# Patient Record
Sex: FEMALE | Race: WHITE | NOT HISPANIC OR LATINO | Employment: FULL TIME | URBAN - METROPOLITAN AREA
[De-identification: names, ages, dates, MRNs, and addresses within clinical notes are randomized per-mention and may not be internally consistent; named-entity substitution may affect disease eponyms.]

---

## 2023-08-04 ENCOUNTER — HOSPITAL ENCOUNTER (OUTPATIENT)
Dept: DATA CONVERSION | Facility: HOSPITAL | Age: 58
End: 2023-08-04
Attending: INTERNAL MEDICINE | Admitting: INTERNAL MEDICINE
Payer: COMMERCIAL

## 2023-08-04 DIAGNOSIS — Z12.11 ENCOUNTER FOR SCREENING FOR MALIGNANT NEOPLASM OF COLON: ICD-10-CM

## 2023-09-14 PROBLEM — J32.0 MAXILLARY SINUSITIS: Status: ACTIVE | Noted: 2019-02-21

## 2023-09-14 PROBLEM — L03.116 CELLULITIS OF LEFT LEG: Status: ACTIVE | Noted: 2021-08-27

## 2023-09-14 PROBLEM — S71.102A OPEN WOUND OF LEFT THIGH: Status: ACTIVE | Noted: 2021-08-30

## 2023-09-14 PROBLEM — R21 RASH AND OTHER NONSPECIFIC SKIN ERUPTION: Status: ACTIVE | Noted: 2023-08-07

## 2023-09-14 PROBLEM — D18.01 HEMANGIOMA OF SKIN AND SUBCUTANEOUS TISSUE: Status: ACTIVE | Noted: 2023-08-07

## 2023-09-14 PROBLEM — R00.2 PALPITATIONS: Status: ACTIVE | Noted: 2019-09-12

## 2023-09-14 PROBLEM — L73.8 OTHER SPECIFIED FOLLICULAR DISORDERS: Status: ACTIVE | Noted: 2023-08-07

## 2023-09-14 PROBLEM — E04.2 NONTOXIC MULTINODULAR GOITER: Status: ACTIVE | Noted: 2023-09-14

## 2023-09-14 PROBLEM — E66.3 OVERWEIGHT: Status: ACTIVE | Noted: 2019-09-12

## 2023-09-14 PROBLEM — E03.9 HYPOTHYROIDISM: Status: ACTIVE | Noted: 2018-06-21

## 2023-09-14 PROBLEM — J34.0 CELLULITIS OF NOSTRIL: Status: ACTIVE | Noted: 2021-03-02

## 2023-09-14 PROBLEM — L08.9 SKIN INFECTION: Status: ACTIVE | Noted: 2022-03-16

## 2023-09-14 PROBLEM — J04.0 LARYNGITIS: Status: ACTIVE | Noted: 2022-12-20

## 2023-09-14 PROBLEM — N95.1 MENOPAUSAL STATE: Status: ACTIVE | Noted: 2021-11-02

## 2023-09-14 PROBLEM — M13.841: Status: ACTIVE | Noted: 2018-06-21

## 2023-09-14 PROBLEM — L71.9 ROSACEA: Status: ACTIVE | Noted: 2022-11-08

## 2023-09-14 PROBLEM — E06.3 AUTOIMMUNE THYROIDITIS: Status: ACTIVE | Noted: 2023-09-14

## 2023-09-14 RX ORDER — IVERMECTIN 10 MG/G
1 CREAM TOPICAL DAILY
COMMUNITY
Start: 2023-06-13

## 2023-09-14 RX ORDER — DOXYCYCLINE 100 MG/1
100 CAPSULE ORAL 2 TIMES DAILY
COMMUNITY
Start: 2023-06-14 | End: 2023-11-16 | Stop reason: ALTCHOICE

## 2023-09-14 RX ORDER — ESTRADIOL 0.5 MG/1
0.5 TABLET ORAL DAILY
COMMUNITY

## 2023-09-14 RX ORDER — MULTIVIT,IRON,MINERALS/LUTEIN
TABLET ORAL
COMMUNITY

## 2023-09-14 RX ORDER — ESTRADIOL 1 MG/1
TABLET ORAL
COMMUNITY
Start: 2017-07-25 | End: 2023-11-16 | Stop reason: ALTCHOICE

## 2023-09-14 RX ORDER — LEVOTHYROXINE SODIUM 50 UG/1
50 TABLET ORAL
COMMUNITY
End: 2024-01-10

## 2023-10-25 ENCOUNTER — OFFICE VISIT (OUTPATIENT)
Dept: DERMATOLOGY | Facility: CLINIC | Age: 58
End: 2023-10-25
Payer: COMMERCIAL

## 2023-10-25 DIAGNOSIS — D48.5 NEOPLASM OF UNCERTAIN BEHAVIOR OF SKIN: ICD-10-CM

## 2023-10-25 DIAGNOSIS — L71.9 ROSACEA: Primary | ICD-10-CM

## 2023-10-25 PROCEDURE — 88305 TISSUE EXAM BY PATHOLOGIST: CPT | Performed by: DERMATOLOGY

## 2023-10-25 PROCEDURE — 11102 TANGNTL BX SKIN SINGLE LES: CPT | Performed by: STUDENT IN AN ORGANIZED HEALTH CARE EDUCATION/TRAINING PROGRAM

## 2023-10-25 PROCEDURE — 88305 TISSUE EXAM BY PATHOLOGIST: CPT | Mod: TC,DER | Performed by: DERMATOLOGY

## 2023-10-25 PROCEDURE — 88312 SPECIAL STAINS GROUP 1: CPT | Performed by: DERMATOLOGY

## 2023-10-25 PROCEDURE — 11103 TANGNTL BX SKIN EA SEP/ADDL: CPT | Performed by: STUDENT IN AN ORGANIZED HEALTH CARE EDUCATION/TRAINING PROGRAM

## 2023-10-25 PROCEDURE — 1036F TOBACCO NON-USER: CPT | Performed by: DERMATOLOGY

## 2023-10-25 PROCEDURE — 99213 OFFICE O/P EST LOW 20 MIN: CPT | Performed by: DERMATOLOGY

## 2023-10-25 NOTE — PROGRESS NOTES
Subjective     Reymundo Sweet is a 57 y.o. female who presents for the following: Rosacea (Using Soolantra cream. Improved since last visit. ).     Pt presents for 5 month follow up for rosacea. At the last visit she had just experienced a flare that also involved neck and chest. She remained clear on doxycycline 100mg BID and Soolantra cream once daily. She had to undergo a colonscopy in early August and could not take doxy at that time since she was not eating food. She decided to remain off of doxycycline and has not flared.     The patient also points out a red bump on her left anterior leg, present for months, scales intermittently. Denies pain or bleeding. She has no personal history of skin cancer but reports past sun exposure without sunscreen.    Review of Systems:  No other skin or systemic complaints other than what is documented elsewhere in the note.    Skin Cancer History  No skin cancer on file.      Specialty Problems          Dermatology Problems    Open wound of left thigh    Rosacea    Hemangioma of skin and subcutaneous tissue    Other specified follicular disorders    Rash and other nonspecific skin eruption        Objective   Well appearing patient in no apparent distress; mood and affect are within normal limits.    A focused skin examination was performed. All findings within normal limits unless otherwise noted below.    Assessment/Plan   1. Rosacea  Head - Anterior (Face)  Mid face erythema with telangiectasias and scattered inflammatory papules.    Currently erythematotelangiectatic subtype, well controlled on Soolantra. She has had flares of papulopustular variant in past that responded well to 3 months of doxycycline 100mg BID  PLAN  Continue Soolantra cream once daily  Plan to repeat doxycycline 100mg BID as needed for flares  Reviewed the importance of sun protection    Related Procedures  Follow Up In Dermatology    2. Neoplasm of uncertain behavior of skin (2)  Left anterior leg -  superior  Erythematous scaly papule          Shave removal    Informed consent: discussed and consent obtained    Timeout: patient name, date of birth, surgical site, and procedure verified    Procedure prep:  Patient was prepped and draped  Anesthesia: the lesion was anesthetized in a standard fashion    Anesthetic:  1% lidocaine w/ epinephrine 1-100,000 local infiltration  Instrument used: DermaBlade    Hemostasis achieved with: aluminum chloride    Outcome: patient tolerated procedure well    Post-procedure details: sterile dressing applied and wound care instructions given    Dressing type: bandage and petrolatum      Staff Communication: Dermatology Local Anesthesia: 1 % Lidocaine / Epinephrine - Amount: 3cc    Specimen 1 - Dermatopathology- DERM LAB  Differential Diagnosis: BCC vs SCC  Check Margins Yes/No?:  No  Comments:    Dermpath Lab: Routine Histopathology (formalin-fixed tissue)    Left anterior leg - inferior  Erythematous scaly papule              Staff Communication: Dermatology Local Anesthesia: 1 % Lidocaine / Epinephrine - Amount: 3cc    Shave removal    Informed consent: discussed and consent obtained    Timeout: patient name, date of birth, surgical site, and procedure verified    Procedure prep:  Patient was prepped and draped  Anesthesia: the lesion was anesthetized in a standard fashion    Anesthetic:  1% lidocaine w/ epinephrine 1-100,000 local infiltration  Instrument used: DermaBlade    Hemostasis achieved with: aluminum chloride    Outcome: patient tolerated procedure well    Post-procedure details: sterile dressing applied and wound care instructions given    Dressing type: bandage and petrolatum      Specimen 2 - Dermatopathology- DERM LAB  Differential Diagnosis: BCC vs SCC  Check Margins Yes/No?:    Comments:    Dermpath Lab: Routine Histopathology (formalin-fixed tissue)      Follow up in 6 months for FBSE

## 2023-10-30 ENCOUNTER — OFFICE VISIT (OUTPATIENT)
Dept: ORTHOPEDIC SURGERY | Facility: CLINIC | Age: 58
End: 2023-10-30
Payer: COMMERCIAL

## 2023-10-30 DIAGNOSIS — M19.049 CMC ARTHRITIS: Primary | ICD-10-CM

## 2023-10-30 LAB
LABORATORY COMMENT REPORT: NORMAL
PATH REPORT.FINAL DX SPEC: NORMAL
PATH REPORT.GROSS SPEC: NORMAL
PATH REPORT.MICROSCOPIC SPEC OTHER STN: NORMAL
PATH REPORT.RELEVANT HX SPEC: NORMAL
PATH REPORT.TOTAL CANCER: NORMAL

## 2023-10-30 PROCEDURE — 1036F TOBACCO NON-USER: CPT | Performed by: ORTHOPAEDIC SURGERY

## 2023-10-30 PROCEDURE — 99213 OFFICE O/P EST LOW 20 MIN: CPT | Performed by: ORTHOPAEDIC SURGERY

## 2023-10-30 PROCEDURE — 20600 DRAIN/INJ JOINT/BURSA W/O US: CPT | Performed by: ORTHOPAEDIC SURGERY

## 2023-10-30 RX ORDER — TRIAMCINOLONE ACETONIDE 40 MG/ML
20 INJECTION, SUSPENSION INTRA-ARTICULAR; INTRAMUSCULAR
Status: COMPLETED | OUTPATIENT
Start: 2023-10-30 | End: 2023-10-30

## 2023-10-30 RX ORDER — LIDOCAINE HYDROCHLORIDE 10 MG/ML
0.5 INJECTION INFILTRATION; PERINEURAL
Status: COMPLETED | OUTPATIENT
Start: 2023-10-30 | End: 2023-10-30

## 2023-10-30 RX ADMIN — TRIAMCINOLONE ACETONIDE 20 MG: 40 INJECTION, SUSPENSION INTRA-ARTICULAR; INTRAMUSCULAR at 08:59

## 2023-10-30 RX ADMIN — LIDOCAINE HYDROCHLORIDE 0.5 ML: 10 INJECTION INFILTRATION; PERINEURAL at 08:59

## 2023-10-30 ASSESSMENT — PAIN - FUNCTIONAL ASSESSMENT: PAIN_FUNCTIONAL_ASSESSMENT: NO/DENIES PAIN

## 2023-10-30 NOTE — PROGRESS NOTES
Patient returns to follow-up on her bilateral thumb CMC joint arthritis which we have managed with injections.  Most recent injection 4 months ago.  She is in the process of moving and has aggravated her thumbs over the last couple of weeks.  The cold wet weather also seems to be a contributing factor to her symptom exacerbation.  She presents today for repeat injections.    Past medical history, medications, allergies, surgical history and review of systems have been reviewed with the patient. Pertinent changes are documented in the HPI. Otherwise they are unchanged when compared to last visit on July 3, 2023.    Physical Examination Findings:  Constitutional: Appears well-developed and well-nourished.  Head: Normocephalic and atraumatic.  Eyes: Pupils are equal and round.  Cardiovascular: Intact distal pulses.   Respiratory: Effort normal. No respiratory distress.  Neurologic: Alert and oriented to person, place, and time.  Skin: Skin is warm and dry.  Hematologic / Lymphatic: No lymphedema, lymphangitis.  Psychiatric: normal mood and affect. Behavior is normal.   Musculoskeletal: Bilateral hand examination reveals bony prominence around the thumb CMC joints.  Positive CMC grind test.  No MP joint instability.  No thenar atrophy.  Normal sensation.    Impression: Bilateral thumb CMC arthritis.    Plan: Steroid injections administered at patient request.    S Inj/Asp: bilateral thumb CMC on 10/30/2023 8:59 AM  Indications: pain  Details: 25 G needle, dorsal approach  Medications (Right): 20 mg triamcinolone acetonide 40 mg/mL; 0.5 mL lidocaine 10 mg/mL (1 %)  Medications (Left): 20 mg triamcinolone acetonide 40 mg/mL; 0.5 mL lidocaine 10 mg/mL (1 %)  Outcome: tolerated well, no immediate complications  Procedure, treatment alternatives, risks and benefits explained, specific risks discussed. Consent was given by the patient. Immediately prior to procedure a time out was called to verify the correct patient,  procedure, equipment, support staff and site/side marked as required. Patient was prepped and draped in the usual sterile fashion.         Return as needed for recurrence or progression of problems .    Alvaro Junior MD    [facility] [employer]  Department of Orthopaedic Surgery  Chief of Hand and Upper Extremity Surgery  [facility]    Dictation performed with the use of voice recognition software. Syntax and grammatical errors may exist.

## 2023-11-08 ENCOUNTER — LAB (OUTPATIENT)
Dept: LAB | Facility: LAB | Age: 58
End: 2023-11-08
Payer: COMMERCIAL

## 2023-11-08 DIAGNOSIS — Z00.00 ENCOUNTER FOR GENERAL ADULT MEDICAL EXAMINATION WITHOUT ABNORMAL FINDINGS: Primary | ICD-10-CM

## 2023-11-08 DIAGNOSIS — Z79.899 OTHER LONG TERM (CURRENT) DRUG THERAPY: ICD-10-CM

## 2023-11-08 DIAGNOSIS — E03.9 HYPOTHYROIDISM, UNSPECIFIED: ICD-10-CM

## 2023-11-08 LAB
ALBUMIN SERPL-MCNC: 4.4 G/DL (ref 3.5–5)
ALP BLD-CCNC: 45 U/L (ref 35–125)
ALT SERPL-CCNC: 13 U/L (ref 5–40)
ANION GAP SERPL CALC-SCNC: 11 MMOL/L
APPEARANCE UR: CLEAR
AST SERPL-CCNC: 15 U/L (ref 5–40)
BASOPHILS # BLD AUTO: 0 X10*3/UL (ref 0–0.1)
BASOPHILS NFR BLD AUTO: 0 %
BILIRUB SERPL-MCNC: 0.5 MG/DL (ref 0.1–1.2)
BILIRUB UR STRIP.AUTO-MCNC: NEGATIVE MG/DL
BUN SERPL-MCNC: 14 MG/DL (ref 8–25)
CALCIUM SERPL-MCNC: 9.2 MG/DL (ref 8.5–10.4)
CHLORIDE SERPL-SCNC: 102 MMOL/L (ref 97–107)
CHOLEST SERPL-MCNC: 225 MG/DL (ref 133–200)
CHOLEST/HDLC SERPL: 2.3 {RATIO}
CO2 SERPL-SCNC: 26 MMOL/L (ref 24–31)
COLOR UR: COLORLESS
CREAT SERPL-MCNC: 0.7 MG/DL (ref 0.4–1.6)
EOSINOPHIL # BLD AUTO: 0.06 X10*3/UL (ref 0–0.7)
EOSINOPHIL NFR BLD AUTO: 1.4 %
ERYTHROCYTE [DISTWIDTH] IN BLOOD BY AUTOMATED COUNT: 11.8 % (ref 11.5–14.5)
GFR SERPL CREATININE-BSD FRML MDRD: >90 ML/MIN/1.73M*2
GLUCOSE SERPL-MCNC: 87 MG/DL (ref 65–99)
GLUCOSE UR STRIP.AUTO-MCNC: NORMAL MG/DL
HCT VFR BLD AUTO: 42 % (ref 36–46)
HDLC SERPL-MCNC: 100 MG/DL
HGB BLD-MCNC: 13.5 G/DL (ref 12–16)
IMM GRANULOCYTES # BLD AUTO: 0.01 X10*3/UL (ref 0–0.7)
IMM GRANULOCYTES NFR BLD AUTO: 0.2 % (ref 0–0.9)
KETONES UR STRIP.AUTO-MCNC: NEGATIVE MG/DL
LDLC SERPL CALC-MCNC: 112 MG/DL (ref 65–130)
LEUKOCYTE ESTERASE UR QL STRIP.AUTO: NEGATIVE
LYMPHOCYTES # BLD AUTO: 1.51 X10*3/UL (ref 1.2–4.8)
LYMPHOCYTES NFR BLD AUTO: 35.4 %
MCH RBC QN AUTO: 30.4 PG (ref 26–34)
MCHC RBC AUTO-ENTMCNC: 32.1 G/DL (ref 32–36)
MCV RBC AUTO: 95 FL (ref 80–100)
MONOCYTES # BLD AUTO: 0.4 X10*3/UL (ref 0.1–1)
MONOCYTES NFR BLD AUTO: 9.4 %
NEUTROPHILS # BLD AUTO: 2.29 X10*3/UL (ref 1.2–7.7)
NEUTROPHILS NFR BLD AUTO: 53.6 %
NITRITE UR QL STRIP.AUTO: NEGATIVE
NRBC BLD-RTO: 0 /100 WBCS (ref 0–0)
PH UR STRIP.AUTO: 6.5 [PH]
PLATELET # BLD AUTO: 275 X10*3/UL (ref 150–450)
POTASSIUM SERPL-SCNC: 4.1 MMOL/L (ref 3.4–5.1)
PROT SERPL-MCNC: 6.6 G/DL (ref 5.9–7.9)
PROT UR STRIP.AUTO-MCNC: NEGATIVE MG/DL
RBC # BLD AUTO: 4.44 X10*6/UL (ref 4–5.2)
RBC # UR STRIP.AUTO: NEGATIVE /UL
SODIUM SERPL-SCNC: 139 MMOL/L (ref 133–145)
SP GR UR STRIP.AUTO: 1
TRIGL SERPL-MCNC: 67 MG/DL (ref 40–150)
TSH SERPL DL<=0.05 MIU/L-ACNC: 3.2 MIU/L (ref 0.27–4.2)
UROBILINOGEN UR STRIP.AUTO-MCNC: NORMAL MG/DL
WBC # BLD AUTO: 4.3 X10*3/UL (ref 4.4–11.3)

## 2023-11-08 PROCEDURE — 36415 COLL VENOUS BLD VENIPUNCTURE: CPT

## 2023-11-08 PROCEDURE — 85025 COMPLETE CBC W/AUTO DIFF WBC: CPT

## 2023-11-08 PROCEDURE — 80061 LIPID PANEL: CPT

## 2023-11-08 PROCEDURE — 81003 URINALYSIS AUTO W/O SCOPE: CPT

## 2023-11-08 PROCEDURE — 80053 COMPREHEN METABOLIC PANEL: CPT

## 2023-11-08 PROCEDURE — 84443 ASSAY THYROID STIM HORMONE: CPT

## 2023-11-12 ASSESSMENT — PROMIS GLOBAL HEALTH SCALE
RATE_QUALITY_OF_LIFE: VERY GOOD
CARRYOUT_SOCIAL_ACTIVITIES: EXCELLENT
RATE_PHYSICAL_HEALTH: EXCELLENT
RATE_GENERAL_HEALTH: VERY GOOD
RATE_AVERAGE_PAIN: 0
CARRYOUT_PHYSICAL_ACTIVITIES: COMPLETELY
RATE_SOCIAL_SATISFACTION: EXCELLENT
RATE_MENTAL_HEALTH: EXCELLENT
EMOTIONAL_PROBLEMS: NEVER

## 2023-11-14 ENCOUNTER — ANCILLARY PROCEDURE (OUTPATIENT)
Dept: RADIOLOGY | Facility: CLINIC | Age: 58
End: 2023-11-14
Payer: COMMERCIAL

## 2023-11-14 VITALS — WEIGHT: 138 LBS | HEIGHT: 64 IN | BODY MASS INDEX: 23.56 KG/M2

## 2023-11-14 DIAGNOSIS — Z12.31 ENCOUNTER FOR SCREENING MAMMOGRAM FOR MALIGNANT NEOPLASM OF BREAST: ICD-10-CM

## 2023-11-14 PROCEDURE — 77067 SCR MAMMO BI INCL CAD: CPT

## 2023-11-16 ENCOUNTER — OFFICE VISIT (OUTPATIENT)
Dept: PRIMARY CARE | Facility: CLINIC | Age: 58
End: 2023-11-16
Payer: COMMERCIAL

## 2023-11-16 VITALS
DIASTOLIC BLOOD PRESSURE: 76 MMHG | WEIGHT: 135 LBS | HEIGHT: 64 IN | OXYGEN SATURATION: 99 % | BODY MASS INDEX: 23.05 KG/M2 | SYSTOLIC BLOOD PRESSURE: 114 MMHG | HEART RATE: 65 BPM

## 2023-11-16 DIAGNOSIS — Z00.00 WELL ADULT EXAM: Primary | ICD-10-CM

## 2023-11-16 DIAGNOSIS — M19.049 LOCALIZED, PRIMARY OSTEOARTHRITIS OF HAND, UNSPECIFIED LATERALITY: ICD-10-CM

## 2023-11-16 DIAGNOSIS — E03.9 HYPOTHYROIDISM, UNSPECIFIED TYPE: ICD-10-CM

## 2023-11-16 DIAGNOSIS — L71.9 ROSACEA: ICD-10-CM

## 2023-11-16 PROCEDURE — 1036F TOBACCO NON-USER: CPT | Performed by: FAMILY MEDICINE

## 2023-11-16 PROCEDURE — 99396 PREV VISIT EST AGE 40-64: CPT | Performed by: FAMILY MEDICINE

## 2023-11-16 NOTE — PROGRESS NOTES
Subjective   Patient ID: Reymundo Sweet is a 58 y.o. female who presents for Annual Exam.    HPI   History of Present Illness Here for a comprehensive physical exam. PMH, PSH, family history and social history were reviewed and updated. Influenza vaccination status is UTD.   She is under the care of [facility] for gynecological care. She is UTD with cervical and breast cancer screenings. She declines colon cancer screening at this time. EKG is UTD. She had fasting labs completed recently.  Pt has Hypothyroidism: She is on levothyroxine 50 micrograms daily. She had been seen by an endocrinologist who  left the area. Care is managed by this office.  Patient is postmenopausal. Her gynecologist has her on estradiol. She recently tried to come off medication but had significant menopausal symptoms.   Patient has rosacea. She sees Dermatology at [facility]. She is on Sulantra with good result.  Review of Systems  GENERAL: denies lack of energy, unexplained weight gain or weight loss, loss of appetite, fever, night sweats.  HEENT: denies difficulty with hearing, sinus problems, runny nose, post-nasal drip, ringing in ears, mouth sores, loose teeth, ear pain, nosebleeds, sore throat, facial pain or numbness.  CV: denies irregular heartbeat, racing heart, chest pains, swelling of feet or legs, pain in legs with walking.  RESPIRATORY: denies shortness of breath, prolonged cough, wheezing, sputum production, prior tuberculosis, pleurisy, oxygen at home, coughing up blood.  GI: denies heartburn, constipation, intolerance to certain foods, diarrhea, abdominal pain, difficulty swallowing, nausea, vomiting, blood in stools, unexplained change in bowel habits, incontinence.  : denies painful urination, frequent urination, urgency, bladder problems.  MS: positive for arthritis in the thumbs at the CMCs. denies joint pain, aching muscles, swelling of joints, joint deformities, back pain.  INTEGUMENT: denies persistent rash,  "itching, new skin lesion, change in existing skin lesion, hair loss or increase, breast changes.  NEUROLOGIC: denies frequent headaches, double vision, weakness, change in sensation, problems with walking or balance, dizziness, tremor, loss of consciousness, uncontrolled motions, episodes of visual loss.  PSYCHIATRIC: denies insomnia, irritability, depression, anxiety, recurrent bad thoughts.  ENDOCRINOLOGIC: denies intolerance to heat or cold, menstrual irregularities, frequent hunger/urination/thirst.  HEMATOLOGIC: denies easy bleeding, easy bruising, anemia, leukemia, unexplained swollen areas.  ALLERGIC/IMMUNOLOGIC: denies seasonal allergies, hay fever symptoms, itching, frequent infections.  Objective   /76   Pulse 65   Ht 1.626 m (5' 4\")   Wt 61.2 kg (135 lb)   SpO2 99%   BMI 23.17 kg/m²     Physical Exam  General appearance: Well developed, overweight, in no acute distress.  Skin: Deferred to Dermatology.  HEENT: The sclerae were anicteric and conjunctivae were pink and moist. Extraocular movements were intact and pupils were equal, round, and reactive to light with normal accommodation. External inspection of the ears and nose showed no scars, lesions, or masses. EACs clear, TMs translucent, ossicles normal appearance, hearing intact. Nasal mucosa non-inflamed, turbinates normal. Lips, teeth, and gums showed normal mucosa. The oral mucosa, hard and soft palate, tongue and posterior pharynx were normal.  Neck: Supple and symmetric. There was no thyroid enlargement, and no tenderness, or masses were felt.   Lungs: Auscultation of the lungs revealed normal breath sounds without any other adventitious sounds.  Cardiovascular: There was a regular rate and rhythm without any murmurs, gallops, or rubs. There were no carotid bruits. Peripheral pulses were 2+ and symmetric.  Abdomen: Soft and nontender with normal bowel sounds. The liver was not enlarged or tender. The spleen was not palpable. There was no " umbilical hernia noted. No ascites was noted.  Lymph nodes: No lymphadenopathy was appreciated in the neck.  Musculoskeletal: There was no tenderness or effusions noted. Muscle strength and tone were normal.   Extremities: No cyanosis, clubbing, or edema.  Neurologic: Alert and oriented x 3. Normal affect. Normal deep tendon reflexes with no pathological reflexes. Sensation to touch was normal.   Rectal: Deferred.  Breasts: Deferred.  Gynecological: Deferred.  Assessment/Plan   Problem List Items Addressed This Visit             ICD-10-CM    Hypothyroidism  Stable.  Continue on levothyroxine E03.9    Rosacea  Stable.  Patient sees dermatology L71.9     Other Visit Diagnoses         Codes    Well adult exam    -  Primary Z00.00    Localized, primary osteoarthritis of hand, unspecified laterality    Chronic, stable. M19.049

## 2023-12-01 ENCOUNTER — OFFICE VISIT (OUTPATIENT)
Dept: PRIMARY CARE | Facility: CLINIC | Age: 58
End: 2023-12-01
Payer: COMMERCIAL

## 2023-12-01 VITALS
TEMPERATURE: 97.1 F | OXYGEN SATURATION: 95 % | WEIGHT: 137 LBS | HEART RATE: 84 BPM | SYSTOLIC BLOOD PRESSURE: 112 MMHG | HEIGHT: 64 IN | BODY MASS INDEX: 23.39 KG/M2 | DIASTOLIC BLOOD PRESSURE: 64 MMHG

## 2023-12-01 DIAGNOSIS — R05.1 ACUTE COUGH: Primary | ICD-10-CM

## 2023-12-01 PROCEDURE — 1036F TOBACCO NON-USER: CPT

## 2023-12-01 PROCEDURE — 99213 OFFICE O/P EST LOW 20 MIN: CPT

## 2023-12-01 RX ORDER — BENZONATATE 100 MG/1
100 CAPSULE ORAL 3 TIMES DAILY PRN
Qty: 30 CAPSULE | Refills: 0 | Status: SHIPPED | OUTPATIENT
Start: 2023-12-01 | End: 2023-12-11

## 2023-12-01 ASSESSMENT — ENCOUNTER SYMPTOMS
SORE THROAT: 1
ARTHRALGIAS: 0
CHILLS: 0
CHEST TIGHTNESS: 1
WHEEZING: 0
FEVER: 0
LIGHT-HEADEDNESS: 0
MYALGIAS: 0
RHINORRHEA: 0
SHORTNESS OF BREATH: 0
DIZZINESS: 0
COUGH: 1

## 2023-12-01 ASSESSMENT — COPD QUESTIONNAIRES: COPD: 0

## 2023-12-01 ASSESSMENT — PAIN SCALES - GENERAL: PAINLEVEL: 0-NO PAIN

## 2023-12-01 NOTE — PROGRESS NOTES
"Subjective   Patient ID: Reymundo Sweet is a 58 y.o. female who presents for Cough (X 4 days) and Sore Throat.    Cough  This is a new problem. Episode onset: 5 days ago. The problem has been unchanged. The problem occurs every few minutes. The cough is Productive of sputum. Associated symptoms include a sore throat. Pertinent negatives include no chest pain, chills, ear congestion, ear pain, fever, myalgias, nasal congestion, postnasal drip, rhinorrhea, shortness of breath or wheezing. Nothing aggravates the symptoms. Treatments tried: mucinex DM, tylenol. There is no history of asthma, COPD, emphysema, environmental allergies or pneumonia.   Sore Throat   This is a new problem. Episode onset: 5 days. The problem has been resolved. Associated symptoms include coughing. Pertinent negatives include no ear pain or shortness of breath.        Review of Systems   Constitutional:  Negative for chills and fever.   HENT:  Positive for sore throat. Negative for ear pain, postnasal drip and rhinorrhea.    Respiratory:  Positive for cough and chest tightness. Negative for shortness of breath and wheezing.    Cardiovascular:  Negative for chest pain.   Musculoskeletal:  Negative for arthralgias and myalgias.   Allergic/Immunologic: Negative for environmental allergies.   Neurological:  Negative for dizziness and light-headedness.       Objective   Blood Pressure 112/64 (BP Location: Left arm)   Pulse 84   Temperature 36.2 °C (97.1 °F) (Temporal)   Height 1.626 m (5' 4\")   Weight 62.1 kg (137 lb)   Oxygen Saturation 95%   Body Mass Index 23.52 kg/m²     Physical Exam  Vitals and nursing note reviewed.   Constitutional:       General: She is not in acute distress.     Appearance: Normal appearance. She is not ill-appearing.   HENT:      Right Ear: Tympanic membrane, ear canal and external ear normal.      Left Ear: Tympanic membrane, ear canal and external ear normal.      Nose: Nose normal. No congestion or rhinorrhea. "      Mouth/Throat:      Mouth: Mucous membranes are moist.      Pharynx: Oropharynx is clear. No oropharyngeal exudate or posterior oropharyngeal erythema.   Cardiovascular:      Rate and Rhythm: Normal rate and regular rhythm.      Heart sounds: Normal heart sounds. No murmur heard.  Pulmonary:      Effort: Pulmonary effort is normal. No respiratory distress.      Breath sounds: Normal breath sounds. No stridor. No wheezing, rhonchi or rales.   Chest:      Chest wall: No tenderness.   Musculoskeletal:      Cervical back: Normal range of motion and neck supple. No tenderness.   Lymphadenopathy:      Cervical: No cervical adenopathy.   Skin:     General: Skin is warm and dry.      Capillary Refill: Capillary refill takes less than 2 seconds.   Neurological:      General: No focal deficit present.      Mental Status: She is alert.       Assessment/Plan   Problem List Items Addressed This Visit    None  Visit Diagnoses       Diagnosis Codes    Acute cough    -  Primary    Acute.  Likely viral.  Discussed supportive care interventions as included in patient instructions.  Follow up if symptoms persist greater than 7 days or if symptoms worsen.   R05.1    Relevant Medications    benzonatate (Tessalon) 100 mg capsule

## 2023-12-04 ENCOUNTER — TELEPHONE (OUTPATIENT)
Dept: PRIMARY CARE | Facility: CLINIC | Age: 58
End: 2023-12-04
Payer: COMMERCIAL

## 2023-12-04 DIAGNOSIS — J01.40 ACUTE NON-RECURRENT PANSINUSITIS: Primary | ICD-10-CM

## 2023-12-04 RX ORDER — AMOXICILLIN 875 MG/1
875 TABLET, FILM COATED ORAL 2 TIMES DAILY
Qty: 20 TABLET | Refills: 0 | Status: SHIPPED | OUTPATIENT
Start: 2023-12-04 | End: 2023-12-14

## 2023-12-04 RX ORDER — AMOXICILLIN 875 MG/1
875 TABLET, FILM COATED ORAL 2 TIMES DAILY
Qty: 20 TABLET | Refills: 0 | Status: SHIPPED | OUTPATIENT
Start: 2023-12-04 | End: 2023-12-04 | Stop reason: SDUPTHER

## 2023-12-04 NOTE — TELEPHONE ENCOUNTER
Prescription sent and spoke with patient to notify her to expect the prescription at the requested pharmacy.

## 2023-12-04 NOTE — TELEPHONE ENCOUNTER
Pt called  [phone] was seen 12-1 for a cough was prescribed cough pearls and Mucinex yesterday she woke up sinus and head pressure she is now in [place] leaving for [place] tomorrow can another RX be called in, [facility] [id number]  [place] [address] [phone]

## 2024-01-04 ENCOUNTER — TELEPHONE (OUTPATIENT)
Dept: DERMATOLOGY | Facility: CLINIC | Age: 59
End: 2024-01-04
Payer: COMMERCIAL

## 2024-01-04 NOTE — TELEPHONE ENCOUNTER
Patient has appt in March 2024, she needs to cancel this due to being out of town. Can you please reschedule her appointment to the end of April 2024? Thank you!   [phone]

## 2024-01-09 DIAGNOSIS — E03.9 HYPOTHYROIDISM, UNSPECIFIED TYPE: ICD-10-CM

## 2024-01-10 RX ORDER — LEVOTHYROXINE SODIUM 50 UG/1
TABLET ORAL
Qty: 75 TABLET | Refills: 2 | Status: SHIPPED | OUTPATIENT
Start: 2024-01-10

## 2024-02-05 ENCOUNTER — OFFICE VISIT (OUTPATIENT)
Dept: ORTHOPEDIC SURGERY | Facility: CLINIC | Age: 59
End: 2024-02-05
Payer: COMMERCIAL

## 2024-02-05 VITALS — BODY MASS INDEX: 23.39 KG/M2 | HEIGHT: 64 IN | WEIGHT: 137 LBS

## 2024-02-05 DIAGNOSIS — M19.049 CMC ARTHRITIS: Primary | ICD-10-CM

## 2024-02-05 PROCEDURE — 20600 DRAIN/INJ JOINT/BURSA W/O US: CPT | Performed by: ORTHOPAEDIC SURGERY

## 2024-02-05 PROCEDURE — 99213 OFFICE O/P EST LOW 20 MIN: CPT | Performed by: ORTHOPAEDIC SURGERY

## 2024-02-05 PROCEDURE — 1036F TOBACCO NON-USER: CPT | Performed by: ORTHOPAEDIC SURGERY

## 2024-02-05 RX ORDER — LIDOCAINE HYDROCHLORIDE 10 MG/ML
0.5 INJECTION INFILTRATION; PERINEURAL
Status: COMPLETED | OUTPATIENT
Start: 2024-02-05 | End: 2024-02-05

## 2024-02-05 RX ORDER — TRIAMCINOLONE ACETONIDE 40 MG/ML
20 INJECTION, SUSPENSION INTRA-ARTICULAR; INTRAMUSCULAR
Status: COMPLETED | OUTPATIENT
Start: 2024-02-05 | End: 2024-02-05

## 2024-02-05 RX ADMIN — LIDOCAINE HYDROCHLORIDE 0.5 ML: 10 INJECTION INFILTRATION; PERINEURAL at 19:07

## 2024-02-05 RX ADMIN — TRIAMCINOLONE ACETONIDE 20 MG: 40 INJECTION, SUSPENSION INTRA-ARTICULAR; INTRAMUSCULAR at 19:07

## 2024-02-05 ASSESSMENT — PAIN DESCRIPTION - DESCRIPTORS: DESCRIPTORS: ACHING

## 2024-02-05 ASSESSMENT — PAIN SCALES - GENERAL: PAINLEVEL_OUTOF10: 4

## 2024-02-05 ASSESSMENT — PAIN - FUNCTIONAL ASSESSMENT: PAIN_FUNCTIONAL_ASSESSMENT: 0-10

## 2024-02-05 NOTE — PROGRESS NOTES
Reymundo returns to clinic today for her bilateral thumbs.     She does well with intermittent injections. The injections we did for her in October 2023 worked well.     Past medical history, medications, allergies, surgical history and review of systems are reviewed and otherwise unchanged when compared to last visit on 10/30/24.         Examination:     Constitutional: Oriented to person, place, and time.     Appears well-developed and well-nourished.     Head: Normocephalic and atraumatic.     Eyes: Pupils are equal, round, and reactive to light.     Cardiovascular: Intact distal pulses.     Pulmonary/Chest/Breast: Effort normal. No respiratory distress.     Neurological: Alert and oriented to person, place, and time.     Skin: Skin is warm and dry.     Psychiatric: normal mood and affect. Behavior is normal.     Musculoskeletal: Examination of the bilateral hands reveals tenderness to palpation of the bilateral thumbs. Positive CMC grind test. No MP joint instability. No thenar atrophy. No triggering. Normal sensation          No new imaging taken today.          Impression: Bilateral thumb CMC arthritis          Plan: She got bilateral thumb CMC injections today. She will follow up as needed.      Risks and benefits of steroid injection discussed with patient. Risks include but are not limited to: pain, infection, allergic reaction to injected medications, changes in the color or appearance of the skin near the location site and along lymphatic drainage pathway, lack of response, cartilage damage, ligament / tendon rupture, and glycemic control issues in diabetic patients. Patient expresses understanding of risks and elects to proceed. Verbal consent was provided and a time out procedure was performed to confirm the appropriate injection location. Using aseptic technique 20 mg triamcinolone and 0.5 cc 1% lidocaine were injected into the bilateral thumb CMC joints. The patient tolerated the injection well. Post  injection instructions were provided.         S Inj/Asp: bilateral thumb CMC on 2/5/2024 7:07 PM  Indications: pain  Details: 25 G needle, dorsal approach  Medications (Right): 20 mg triamcinolone acetonide 40 mg/mL; 0.5 mL lidocaine 10 mg/mL (1 %)  Medications (Left): 20 mg triamcinolone acetonide 40 mg/mL; 0.5 mL lidocaine 10 mg/mL (1 %)  Outcome: tolerated well, no immediate complications  Procedure, treatment alternatives, risks and benefits explained, specific risks discussed. Consent was given by the patient. Immediately prior to procedure a time out was called to verify the correct patient, procedure, equipment, support staff and site/side marked as required. Patient was prepped and draped in the usual sterile fashion.           Alvaro Junior MD          [facility] [employer]     Department of Orthopaedic Surgery     Chief of Hand and Upper Extremity Surgery     [facility]     Scribe Attestation  By signing my name below, IAna , Scribe   attest that this documentation has been prepared under the direction and in the presence of Alvaro Junior MD.

## 2024-03-06 ENCOUNTER — APPOINTMENT (OUTPATIENT)
Dept: DERMATOLOGY | Facility: CLINIC | Age: 59
End: 2024-03-06
Payer: COMMERCIAL

## 2024-04-24 ENCOUNTER — APPOINTMENT (OUTPATIENT)
Dept: DERMATOLOGY | Facility: CLINIC | Age: 59
End: 2024-04-24
Payer: COMMERCIAL

## 2024-05-01 ENCOUNTER — OFFICE VISIT (OUTPATIENT)
Dept: DERMATOLOGY | Facility: CLINIC | Age: 59
End: 2024-05-01
Payer: COMMERCIAL

## 2024-05-01 DIAGNOSIS — L71.9 ROSACEA: ICD-10-CM

## 2024-05-01 DIAGNOSIS — L57.8 SUN-DAMAGED SKIN: ICD-10-CM

## 2024-05-01 DIAGNOSIS — L57.0 ACTINIC KERATOSIS: Primary | ICD-10-CM

## 2024-05-01 PROCEDURE — 17003 DESTRUCT PREMALG LES 2-14: CPT | Performed by: STUDENT IN AN ORGANIZED HEALTH CARE EDUCATION/TRAINING PROGRAM

## 2024-05-01 PROCEDURE — 1036F TOBACCO NON-USER: CPT | Performed by: DERMATOLOGY

## 2024-05-01 PROCEDURE — 99213 OFFICE O/P EST LOW 20 MIN: CPT | Performed by: DERMATOLOGY

## 2024-05-01 PROCEDURE — 17000 DESTRUCT PREMALG LESION: CPT | Performed by: STUDENT IN AN ORGANIZED HEALTH CARE EDUCATION/TRAINING PROGRAM

## 2024-05-01 NOTE — PROGRESS NOTES
Subjective     Reymundo Sweet is a 58 y.o. female who presents for the following: Actinic Keratosis (Left leg inferior ).     Review of Systems:  No other skin or systemic complaints other than what is documented elsewhere in the note.    The following portions of the chart were reviewed this encounter and updated as appropriate:   Tobacco  Allergies  Meds  Problems  Med Hx  Surg Hx         Skin Cancer History  No skin cancer on file.      Specialty Problems          Dermatology Problems    Open wound of left thigh    Rosacea    Hemangioma of skin and subcutaneous tissue    Other specified follicular disorders    Rash and other nonspecific skin eruption        Objective   Well appearing patient in no apparent distress; mood and affect are within normal limits.    A focused skin examination was performed. All findings within normal limits unless otherwise noted below.    Assessment/Plan   1. Actinic keratosis (2)  Left Lower Leg - Anterior, Right Forearm - Anterior  Erythematous macules with gritty scale.     Reviewed relationship to sun exposure and precancerous nature. Recommended cryotherapy treatment today. Lesion on left anterior inferior leg is a biopsy confirmed AK, present on deep and peripheral margins, there is some persistent scale on periphery on exam today.    Destr of lesion - Left Lower Leg - Anterior, Right Forearm - Anterior  Complexity: simple    Destruction method: cryotherapy    Informed consent: discussed and consent obtained    Lesion destroyed using liquid nitrogen: Yes    Cryotherapy cycles:  1  Outcome: patient tolerated procedure well with no complications    Post-procedure details: wound care instructions given      2. Rosacea  Head - Anterior (Face)  Mid face erythema with telangiectasias and scattered inflammatory papules.    Well controlled on Soolantra. Patient notes intermittent irritation of left superior eyelid that responds well to vaseline. We reviewed the signs and  symptoms of ocular rosacea. This may or may not be related to rosacea, however, since Vaseline is working there is no need for further treatment at this time.    3. Sun damaged skin  Actinically damaged skin-  - Sun protective behavior reviewed and encouraged including the use of over-the-counter sunscreen with SPF30+ daily (reapply every 1.5 hours when outdoors), UPF clothing, broad rimmed hats, sunglasses, and avoidance of midday sun. Home skin monitoring encouraged and how to monitor for skin cancer (changing or new moles, new rapidly growing or non-healing lesions) reviewed. Patient encouraged to call with interval concerns or changes.      Mo Lopez DO, MPH  PGY-4, Dept. of Dermatology    I saw and evaluated the patient, participating in the key elements of the service.  I discussed the findings, assessment and plan with the resident and agree with resident’s findings and plan as documented in the resident's note.  I was immediately available for the entirety of the procedure(s) and present for the key and critical portions.     Emre Grewal MD PhD

## 2024-05-10 ENCOUNTER — OFFICE VISIT (OUTPATIENT)
Dept: PRIMARY CARE | Facility: CLINIC | Age: 59
End: 2024-05-10
Payer: COMMERCIAL

## 2024-05-10 VITALS
BODY MASS INDEX: 24.48 KG/M2 | TEMPERATURE: 97.8 F | OXYGEN SATURATION: 96 % | SYSTOLIC BLOOD PRESSURE: 107 MMHG | HEART RATE: 74 BPM | WEIGHT: 142.6 LBS | DIASTOLIC BLOOD PRESSURE: 72 MMHG

## 2024-05-10 DIAGNOSIS — R39.9 UTI SYMPTOMS: ICD-10-CM

## 2024-05-10 DIAGNOSIS — N30.01 ACUTE CYSTITIS WITH HEMATURIA: Primary | ICD-10-CM

## 2024-05-10 LAB
POC APPEARANCE, URINE: CLEAR
POC BILIRUBIN, URINE: NEGATIVE
POC BLOOD, URINE: ABNORMAL
POC COLOR, URINE: YELLOW
POC GLUCOSE, URINE: NEGATIVE MG/DL
POC KETONES, URINE: ABNORMAL MG/DL
POC LEUKOCYTES, URINE: ABNORMAL
POC NITRITE,URINE: POSITIVE
POC PH, URINE: 7 PH
POC PROTEIN, URINE: ABNORMAL MG/DL
POC SPECIFIC GRAVITY, URINE: 1.02
POC UROBILINOGEN, URINE: 0.2 EU/DL

## 2024-05-10 PROCEDURE — 81002 URINALYSIS NONAUTO W/O SCOPE: CPT

## 2024-05-10 PROCEDURE — 87086 URINE CULTURE/COLONY COUNT: CPT

## 2024-05-10 PROCEDURE — 99213 OFFICE O/P EST LOW 20 MIN: CPT

## 2024-05-10 PROCEDURE — 87186 SC STD MICRODIL/AGAR DIL: CPT

## 2024-05-10 RX ORDER — NITROFURANTOIN 25; 75 MG/1; MG/1
100 CAPSULE ORAL 2 TIMES DAILY
Qty: 14 CAPSULE | Refills: 0 | Status: SHIPPED | OUTPATIENT
Start: 2024-05-10 | End: 2024-05-17

## 2024-05-10 ASSESSMENT — PATIENT HEALTH QUESTIONNAIRE - PHQ9
1. LITTLE INTEREST OR PLEASURE IN DOING THINGS: NOT AT ALL
1. LITTLE INTEREST OR PLEASURE IN DOING THINGS: NOT AT ALL
2. FEELING DOWN, DEPRESSED OR HOPELESS: NOT AT ALL
SUM OF ALL RESPONSES TO PHQ9 QUESTIONS 1 AND 2: 0
2. FEELING DOWN, DEPRESSED OR HOPELESS: NOT AT ALL
SUM OF ALL RESPONSES TO PHQ9 QUESTIONS 1 AND 2: 0

## 2024-05-10 ASSESSMENT — ENCOUNTER SYMPTOMS
FREQUENCY: 1
NAUSEA: 0
HEMATURIA: 0
VOMITING: 0
SWEATS: 0
CHILLS: 0
FLANK PAIN: 0

## 2024-05-10 ASSESSMENT — PAIN SCALES - GENERAL: PAINLEVEL: 0-NO PAIN

## 2024-05-10 NOTE — PROGRESS NOTES
Subjective   Patient ID: Reymundo Sweet is a 58 y.o. female who presents for Urinary Frequency (Burning started today and urinary frequency x 3-4 days).    UTI   This is a new problem. Episode onset: 3-4 days ago. The problem occurs every urination. The quality of the pain is described as burning. The pain is at a severity of 3/10. There has been no fever. She is Sexually active (not recently). There is No history of pyelonephritis. Associated symptoms include frequency and urgency. Pertinent negatives include no chills, discharge, flank pain, hematuria, hesitancy, nausea, sweats or vomiting. She has tried NSAIDs and acetaminophen for the symptoms. The treatment provided mild relief.        Review of Systems   Constitutional:  Negative for chills.   Gastrointestinal:  Negative for nausea and vomiting.   Genitourinary:  Positive for frequency and urgency. Negative for flank pain, hematuria and hesitancy.       Objective   /72 (BP Location: Left arm)   Pulse 74   Temp 36.6 °C (97.8 °F) (Temporal)   Wt 64.7 kg (142 lb 9.6 oz)   SpO2 96%   BMI 24.48 kg/m²     Physical Exam  Vitals and nursing note reviewed.   Constitutional:       General: She is not in acute distress.     Appearance: Normal appearance. She is normal weight.   Cardiovascular:      Rate and Rhythm: Normal rate and regular rhythm.      Heart sounds: Normal heart sounds, S1 normal and S2 normal.   Pulmonary:      Effort: Pulmonary effort is normal.      Breath sounds: Normal breath sounds.   Abdominal:      General: Abdomen is flat. Bowel sounds are normal. There is no distension.      Palpations: Abdomen is soft. There is no hepatomegaly or splenomegaly.      Tenderness: There is abdominal tenderness in the suprapubic area. There is no right CVA tenderness, left CVA tenderness, guarding or rebound.   Skin:     General: Skin is warm and dry.      Capillary Refill: Capillary refill takes less than 2 seconds.   Neurological:      General: No  focal deficit present.      Mental Status: She is alert.           Assessment/Plan   Problem List Items Addressed This Visit    None  Visit Diagnoses         Codes    Acute cystitis with hematuria    -  Primary  Acute.  Urine dipstick: +leukocytes, +nitrates, + blood  Urine sent for C & S, will call with results and adjust treatment accordingly.  Start nitrofurantoin 100 mg BID x 7 days - please complete full course unless you hear otherwise from our office.  Discussed indication for antibiotic use, administration instructions, and adverse effects with patient.  Increase your fluid intake, Tylenol or Motrin as needed for pain or fever.  May use OTC AZO/UROSTAT/Cystex for symptoms relief if desired.   Discussed hygiene for prevention.  Call our office to report and new fever/chills or back pain.  Follow up in 1 week if symptoms persist.    N30.01    Relevant Orders    Urine Culture (Completed)    UTI symptoms     R39.9    Relevant Orders    POCT UA (nonautomated) manually resulted (Completed)

## 2024-05-13 LAB — BACTERIA UR CULT: ABNORMAL

## 2024-06-03 ENCOUNTER — OFFICE VISIT (OUTPATIENT)
Dept: PRIMARY CARE | Facility: CLINIC | Age: 59
End: 2024-06-03
Payer: COMMERCIAL

## 2024-06-03 VITALS
DIASTOLIC BLOOD PRESSURE: 74 MMHG | OXYGEN SATURATION: 98 % | BODY MASS INDEX: 24.07 KG/M2 | HEIGHT: 64 IN | WEIGHT: 141 LBS | SYSTOLIC BLOOD PRESSURE: 120 MMHG | TEMPERATURE: 97.5 F | HEART RATE: 72 BPM

## 2024-06-03 DIAGNOSIS — R35.0 URINARY FREQUENCY: ICD-10-CM

## 2024-06-03 DIAGNOSIS — R30.9 URINARY PAIN: Primary | ICD-10-CM

## 2024-06-03 LAB
POC APPEARANCE, URINE: CLEAR
POC BILIRUBIN, URINE: NEGATIVE
POC BLOOD, URINE: ABNORMAL
POC COLOR, URINE: YELLOW
POC GLUCOSE, URINE: NEGATIVE MG/DL
POC KETONES, URINE: NEGATIVE MG/DL
POC LEUKOCYTES, URINE: NEGATIVE
POC NITRITE,URINE: NEGATIVE
POC PH, URINE: 7.5 PH
POC PROTEIN, URINE: NEGATIVE MG/DL
POC SPECIFIC GRAVITY, URINE: 1.01
POC UROBILINOGEN, URINE: 0.2 EU/DL

## 2024-06-03 PROCEDURE — 81003 URINALYSIS AUTO W/O SCOPE: CPT | Performed by: FAMILY MEDICINE

## 2024-06-03 PROCEDURE — 99213 OFFICE O/P EST LOW 20 MIN: CPT | Performed by: FAMILY MEDICINE

## 2024-06-03 ASSESSMENT — PAIN SCALES - GENERAL: PAINLEVEL: 0-NO PAIN

## 2024-06-03 ASSESSMENT — PATIENT HEALTH QUESTIONNAIRE - PHQ9
1. LITTLE INTEREST OR PLEASURE IN DOING THINGS: NOT AT ALL
2. FEELING DOWN, DEPRESSED OR HOPELESS: NOT AT ALL
SUM OF ALL RESPONSES TO PHQ9 QUESTIONS 1 AND 2: 0

## 2024-06-03 NOTE — PROGRESS NOTES
"Subjective   Patient ID: Reymundo Sweet is a 58 y.o. female who presents for Urinary Frequency (And burning.   Symptoms started 3-4 days ago.  She mostly burning sensation during urination intermittently//She was seen 5/10/2024 for cystitis and treated with Macrobid x 10 days-  patient finished medication and felt that symptoms had resolved).    HPI here for urinary burning after urination.  Patient was seen a few weeks back for her first ever urinary infection.  At that time she had urgency and frequency with pressure.  She was treated with antibiotics and felt better.  Now over the past 3 to 4 days she has developed a burning sensation that her ears after voiding.  She has not had any lissa blood in her urine however.  She is not sexually active    Review of Systems  Constitutional: Patient is negative for fever, fatigue, weight change.  HEENT: Patient is never change in vision, hearing, swallow.  Cardio: Patient is negative for chest pain, lower extremity edema.  Pulmonary: Patient is negative for cough, shortness of breath.  Genitourinary: Patient is positive for burning postvoid.  She is negative for urgency and frequency  Objective   /74 (BP Location: Left arm, Patient Position: Sitting, BP Cuff Size: Adult)   Pulse 72   Temp 36.4 °C (97.5 °F)   Ht 1.626 m (5' 4\")   Wt 64 kg (141 lb)   SpO2 98%   BMI 24.20 kg/m²     Physical Exam  General: Awake alert no apparent distress.  HEENT: Moist oral mucosa no cervical lymphadenopathy.  Cardio: Heart S1-S2 no murmur rub or gallop.  Pulmonary: Lungs clear to auscultation bilaterally.  Assessment/Plan   Problem List Items Addressed This Visit    None  Visit Diagnoses         Codes    Urinary pain    -  Primary needs better control.  UA today did not show anything but a little bit of blood.  This could be a urinary infection but could also be secondary to atrophy.  Patient has reduced her estradiol intake on her own.  She is asked to return her estradiol " intake to 0.5 mg tablets once daily.  She will follow-up in 2 weeks if no better. R30.9    Urinary frequency     R35.0    Relevant Orders    POCT UA Automated manually resulted (Completed)

## 2024-06-07 NOTE — PROGRESS NOTES
[facility]  Hand and Upper Extremity Service  Follow up visit         Follow up for: Bilateral thumbs     Interval History: Received bilateral thumb CMC injections on last visit and did well but noticed that within the last 3 weeks her symptoms have returned. She's increased her intake of Advil and wears neoprene splints at nighttime and during the day intermittently. She's currently doing a lot of yard work that that seems to aggravate her symptoms.               Past medical history, medications, allergies, surgical history and review of systems are reviewed and otherwise unchanged when compared to last visit on 2/5/24         Examination:  Constitutional: Oriented to person, place, and time.  Appears well-developed and well-nourished.  Head: Normocephalic and atraumatic.  Eyes: Pupils are equal, round, and reactive to light.  Cardiovascular: Intact distal pulses.  Pulmonary/Chest/Breast: Effort normal. No respiratory distress.  Neurological: Alert and oriented to person, place, and time.  Skin: Skin is warm and dry.  Psychiatric: normal mood and affect.  Behavior is normal.  Musculoskeletal: Bilateral hands reveal arthritic changes at the base of the thumb. Positive CMC grind test. No thenar atrophy. Sensation intact to light touch.       Personal Interpretation of Diagnostic studies: No new images obtained       Impression: Bilateral thumb CMC arthritis       Plan: We gave her bilateral CMC joint injections today and she'll return to see me as needed for reoccurrence of her symptoms.       In Office Procedures Performed: Bilateral CMC joint injections   S Inj/Asp: bilateral thumb CMC on 6/10/2024 6:16 PM  Indications: pain  Details: 25 G needle, dorsal approach  Medications (Right): 20 mg triamcinolone acetonide 40 mg/mL; 0.5 mL lidocaine 10 mg/mL (1 %)  Medications (Left): 20 mg triamcinolone acetonide 40 mg/mL; 0.5 mL lidocaine 10 mg/mL (1 %)  Outcome: tolerated well, no  immediate complications  Procedure, treatment alternatives, risks and benefits explained, specific risks discussed. Consent was given by the patient. Immediately prior to procedure a time out was called to verify the correct patient, procedure, equipment, support staff and site/side marked as required. Patient was prepped and draped in the usual sterile fashion.              Follow up: As needed             Alvaro Junior MD  [facility]  Department of Orthopaedic Surgery  Hand and Upper Extremity Reconstruction    Scribe Attestation  By signing my name below, I, Susu Ramsey , Scribrobert   attest that this documentation has been prepared under the direction and in the presence of Dr. Alvaro Junior.    Dictation performed with the use of voice recognition software.  Syntax and grammatical errors may exist.

## 2024-06-10 ENCOUNTER — OFFICE VISIT (OUTPATIENT)
Dept: ORTHOPEDIC SURGERY | Facility: CLINIC | Age: 59
End: 2024-06-10
Payer: COMMERCIAL

## 2024-06-10 VITALS — HEIGHT: 64 IN | WEIGHT: 141 LBS | BODY MASS INDEX: 24.07 KG/M2

## 2024-06-10 DIAGNOSIS — M19.049 CMC ARTHRITIS: Primary | ICD-10-CM

## 2024-06-10 PROCEDURE — 20600 DRAIN/INJ JOINT/BURSA W/O US: CPT | Performed by: ORTHOPAEDIC SURGERY

## 2024-06-10 PROCEDURE — 1036F TOBACCO NON-USER: CPT | Performed by: ORTHOPAEDIC SURGERY

## 2024-06-10 PROCEDURE — 99213 OFFICE O/P EST LOW 20 MIN: CPT | Performed by: ORTHOPAEDIC SURGERY

## 2024-06-10 RX ORDER — LIDOCAINE HYDROCHLORIDE 10 MG/ML
0.5 INJECTION INFILTRATION; PERINEURAL
Status: COMPLETED | OUTPATIENT
Start: 2024-06-10 | End: 2024-06-10

## 2024-06-10 RX ORDER — TRIAMCINOLONE ACETONIDE 40 MG/ML
20 INJECTION, SUSPENSION INTRA-ARTICULAR; INTRAMUSCULAR
Status: COMPLETED | OUTPATIENT
Start: 2024-06-10 | End: 2024-06-10

## 2024-06-10 RX ADMIN — TRIAMCINOLONE ACETONIDE 20 MG: 40 INJECTION, SUSPENSION INTRA-ARTICULAR; INTRAMUSCULAR at 18:16

## 2024-06-10 RX ADMIN — LIDOCAINE HYDROCHLORIDE 0.5 ML: 10 INJECTION INFILTRATION; PERINEURAL at 18:16

## 2024-06-10 ASSESSMENT — PAIN - FUNCTIONAL ASSESSMENT: PAIN_FUNCTIONAL_ASSESSMENT: 0-10

## 2024-06-10 ASSESSMENT — PAIN DESCRIPTION - DESCRIPTORS: DESCRIPTORS: ACHING;SORE

## 2024-06-10 ASSESSMENT — PAIN SCALES - GENERAL: PAINLEVEL_OUTOF10: 6

## 2024-06-12 DIAGNOSIS — L71.9 ROSACEA: ICD-10-CM

## 2024-06-15 RX ORDER — IVERMECTIN 10 MG/G
1 CREAM TOPICAL DAILY
Qty: 45 G | Refills: 3 | Status: SHIPPED | OUTPATIENT
Start: 2024-06-15

## 2024-09-04 ENCOUNTER — TELEPHONE (OUTPATIENT)
Dept: PRIMARY CARE | Facility: CLINIC | Age: 59
End: 2024-09-04
Payer: COMMERCIAL

## 2024-09-04 DIAGNOSIS — E03.9 HYPOTHYROIDISM, UNSPECIFIED TYPE: ICD-10-CM

## 2024-09-04 DIAGNOSIS — Z00.00 ROUTINE GENERAL MEDICAL EXAMINATION AT A HEALTH CARE FACILITY: ICD-10-CM

## 2024-10-05 NOTE — PROGRESS NOTES
[facility]  Hand and Upper Extremity Service  Follow up visit         Follow up for: Bilateral thumbs     Interval History: She returns for her bilateral thumbs. She received bilateral thumb CMC injections on last visit and she received 4 months of relief. She indicates her symptoms have returned but she's only had to use lower amounts of anti-inflammatory medications and is very encouraged. She is requesting repeat injections today.               Past medical history, medications, allergies, surgical history and review of systems are reviewed and otherwise unchanged when compared to last visit on 6/10/24         Examination:  Constitutional: Oriented to person, place, and time.  Appears well-developed and well-nourished.  Head: Normocephalic and atraumatic.  Eyes: Pupils are equal, round, and reactive to light.  Cardiovascular: Intact distal pulses.  Pulmonary/Chest/Breast: Effort normal. No respiratory distress.  Neurological: Alert and oriented to person, place, and time.  Skin: Skin is warm and dry.  Psychiatric: normal mood and affect.  Behavior is normal.  Musculoskeletal: Bilateral hands reveal mild bony prominence around thumb CMC joints. Positive CMC grind test with no MCP joint hyperextension instability.       Personal Interpretation of Diagnostic studies: No new images obtained       Impression: Bilateral thumb CMC arthritis       Plan: We discussed that a large percentage of patients have improvement of symptoms over time and this may be the beginning of that process for her. We gave her bilateral thumb CMC injections and she'll monitor her symptoms. She'll return if her symptoms recur and have possible repeat injections.       In Office Procedures Performed: Bilateral thumb CMC injections  S Inj/Asp: bilateral thumb CMC on 10/7/2024 12:20 PM  Indications: pain  Details: 25 G needle, dorsal approach  Medications (Right): 20 mg triamcinolone acetonide 40 mg/mL; 0.5 mL  lidocaine 10 mg/mL (1 %)  Medications (Left): 20 mg triamcinolone acetonide 40 mg/mL; 0.5 mL lidocaine 10 mg/mL (1 %)  Outcome: tolerated well, no immediate complications  Procedure, treatment alternatives, risks and benefits explained, specific risks discussed. Consent was given by the patient. Immediately prior to procedure a time out was called to verify the correct patient, procedure, equipment, support staff and site/side marked as required. Patient was prepped and draped in the usual sterile fashion.             Follow up: As needed             Alvaro Junior MD  [facility]  Department of Orthopaedic Surgery  Hand and Upper Extremity Reconstruction    Scribe Attestation  By signing my name below, I, Susu Ramsey , Scribe   attest that this documentation has been prepared under the direction and in the presence of Dr. Alvaro Junior.    Dictation performed with the use of voice recognition software.  Syntax and grammatical errors may exist.

## 2024-10-07 ENCOUNTER — APPOINTMENT (OUTPATIENT)
Dept: ORTHOPEDIC SURGERY | Facility: CLINIC | Age: 59
End: 2024-10-07
Payer: COMMERCIAL

## 2024-10-07 VITALS — HEIGHT: 64 IN | WEIGHT: 141 LBS | BODY MASS INDEX: 24.07 KG/M2

## 2024-10-07 DIAGNOSIS — M19.049 CMC ARTHRITIS: Primary | ICD-10-CM

## 2024-10-07 PROCEDURE — 3008F BODY MASS INDEX DOCD: CPT | Performed by: ORTHOPAEDIC SURGERY

## 2024-10-07 PROCEDURE — 99213 OFFICE O/P EST LOW 20 MIN: CPT | Performed by: ORTHOPAEDIC SURGERY

## 2024-10-07 PROCEDURE — 1036F TOBACCO NON-USER: CPT | Performed by: ORTHOPAEDIC SURGERY

## 2024-10-07 PROCEDURE — 20600 DRAIN/INJ JOINT/BURSA W/O US: CPT | Performed by: ORTHOPAEDIC SURGERY

## 2024-10-07 RX ORDER — LIDOCAINE HYDROCHLORIDE 10 MG/ML
0.5 INJECTION, SOLUTION INFILTRATION; PERINEURAL
Status: COMPLETED | OUTPATIENT
Start: 2024-10-07 | End: 2024-10-07

## 2024-10-07 RX ORDER — TRIAMCINOLONE ACETONIDE 40 MG/ML
20 INJECTION, SUSPENSION INTRA-ARTICULAR; INTRAMUSCULAR
Status: COMPLETED | OUTPATIENT
Start: 2024-10-07 | End: 2024-10-07

## 2024-10-08 ENCOUNTER — TELEPHONE (OUTPATIENT)
Dept: OBSTETRICS AND GYNECOLOGY | Facility: CLINIC | Age: 59
End: 2024-10-08
Payer: COMMERCIAL

## 2024-10-08 DIAGNOSIS — E03.9 HYPOTHYROIDISM, UNSPECIFIED TYPE: ICD-10-CM

## 2024-10-08 DIAGNOSIS — Z78.0 MENOPAUSE: Primary | ICD-10-CM

## 2024-10-08 RX ORDER — ESTRADIOL 0.5 MG/1
0.5 TABLET ORAL DAILY
Qty: 90 TABLET | Refills: 3 | Status: SHIPPED | OUTPATIENT
Start: 2024-10-08 | End: 2025-10-08

## 2024-10-08 RX ORDER — LEVOTHYROXINE SODIUM 50 UG/1
TABLET ORAL
Qty: 75 TABLET | Refills: 2 | Status: SHIPPED | OUTPATIENT
Start: 2024-10-08

## 2024-10-08 NOTE — TELEPHONE ENCOUNTER
Est CS pt last seen 09/28/2023 Annual / Annual sched with WC 12/18/2024 / pt left  vm requesting refill of her Estrace 0.5mg 1 po daily until Annual / pt would like a call when rx sent / msg to Dr Rosas

## 2024-11-05 NOTE — PROGRESS NOTES
Subjective     Reymundo Sweet is a 58 y.o. female who presents for the following: Rosacea (Soolantra cream - refills needed), Skin Check (LN2 follow up on left leg ), and Skin Tag (Under left breast and under right arm ).     6 month follow up for rosacea  Treatment: soolantra once daily  Well controlled. Reports occasional flares that respond well to soolantra    At last visit had cryotherapy treatment for actinic keratosis on the left leg and right anterior forearm    Review of Systems:  No other skin or systemic complaints other than what is documented elsewhere in the note.    The following portions of the chart were reviewed this encounter and updated as appropriate:       Specialty Problems          Dermatology Problems    Open wound of left thigh    Rosacea    Hemangioma of skin and subcutaneous tissue    Other specified follicular disorders    Rash and other nonspecific skin eruption     Past Medical History:  Reymundo Sweet  has a past medical history of Anxiety.    Past Surgical History:  Reymundo Sweet  has a past surgical history that includes Breast biopsy (Left) and Hysterectomy.    Family History:  Patient family history is not on file.    Social History:  Reymundo Sweet  reports that she has never smoked. She has never used smokeless tobacco. She reports that she does not drink alcohol and does not use drugs.    Allergies:  Latex and Adhesive tape-silicones    Current Medications / CAM's:    Current Outpatient Medications:     estradiol (Estrace) 0.5 mg tablet, Take 1 tablet (0.5 mg) by mouth once daily., Disp: 90 tablet, Rfl: 3    levothyroxine (Synthroid, Levoxyl) 50 mcg tablet, TAKE 1 TABLET BY MOUTH FOR 6 DAYS A WEEK, Disp: 75 tablet, Rfl: 2    multivit-min-iron-FA-vit K-lut (Centrum Silver Women) 8 mg iron-400 mcg-50 mcg tablet, Take by mouth., Disp: , Rfl:     Soolantra 1 % cream, Apply 1 Application topically once daily., Disp: 45 g, Rfl: 3     Objective   Well appearing patient in no  apparent distress; mood and affect are within normal limits.    A focused examination was performed including face, upper extremities, lower extremities, and trunk All findings within normal limits unless otherwise noted below.    - scattered tan macules, telangiectasias, and general photo-damage    Stuck on verrucous, tan-brown papules and plaques.      Well demarcated symmetrical brown to skin colored papules      Head - Anterior (Face)  Mid face erythema with telangiectasias and no scattered inflammatory papules.    Left Abdomen (side) - Upper  Skin colored pedunculated papule on the left lateral trunk       Assessment/Plan   Rosacea  Head - Anterior (Face)    Well controlled on Soolantra once daily, refilled today.     Related Medications  Soolantra 1 % cream  Apply 1 Application topically once daily.    Skin tag  Left Abdomen (side) - Upper    - Reviewed benign nature of this lesion and that no treatment is needed. If it is bothersome could consider cosmetic treatment in the future with cryotherapy. Patient will think about it at this time.     Sun-damaged skin    Actinically damaged skin-  - Sun protective behavior reviewed and encouraged including the use of over-the-counter sunscreen with SPF30+ daily (reapply every 1.5 hours when outdoors), UPF clothing, broad rimmed hats, sunglasses, and avoidance of midday sun. Home skin monitoring encouraged and how to monitor for skin cancer (changing or new moles, new rapidly growing or non-healing lesions) reviewed. Patient encouraged to call with interval concerns or changes.    - No signs of recurrence of the actinic keratosis treated on the left anterior leg    Seborrheic keratosis    Benign nevus    Benign melanocytic nevi  - Reassured patient of benign nature of these lesions  - These lesions did not meet threshold for biopsy today  - Recommended use of 30+ SPF sunscreen daily and to reapply every 1-2 hours whiles outdoors         Follow up in 1 year for rosacea  and skin check, call for appointment    Naheed Mitchell MD   PGY-5 Dermatology Resident    I saw and evaluated the patient. I personally obtained the key and critical portions of the history and physical exam or was physically present for key and critical portions performed by the resident/fellow. I reviewed the resident/fellow's documentation and discussed the patient with the resident/fellow. I agree with the resident/fellow's medical decision making as documented in the note.    Emre Grewal MD PhD

## 2024-11-06 ENCOUNTER — APPOINTMENT (OUTPATIENT)
Dept: DERMATOLOGY | Facility: CLINIC | Age: 59
End: 2024-11-06
Payer: COMMERCIAL

## 2024-11-06 DIAGNOSIS — L57.8 SUN-DAMAGED SKIN: ICD-10-CM

## 2024-11-06 DIAGNOSIS — L71.9 ROSACEA: Primary | ICD-10-CM

## 2024-11-06 DIAGNOSIS — D22.9 BENIGN NEVUS: ICD-10-CM

## 2024-11-06 DIAGNOSIS — L82.1 SEBORRHEIC KERATOSIS: ICD-10-CM

## 2024-11-06 DIAGNOSIS — L91.8 SKIN TAG: ICD-10-CM

## 2024-11-06 PROCEDURE — 99213 OFFICE O/P EST LOW 20 MIN: CPT | Performed by: DERMATOLOGY

## 2024-11-06 RX ORDER — IVERMECTIN 10 MG/G
1 CREAM TOPICAL DAILY
Qty: 45 G | Refills: 3 | Status: SHIPPED | OUTPATIENT
Start: 2024-11-06

## 2024-12-12 ENCOUNTER — LAB (OUTPATIENT)
Dept: LAB | Facility: LAB | Age: 59
End: 2024-12-12
Payer: COMMERCIAL

## 2024-12-12 DIAGNOSIS — E03.9 HYPOTHYROIDISM, UNSPECIFIED TYPE: ICD-10-CM

## 2024-12-12 DIAGNOSIS — Z00.00 ROUTINE GENERAL MEDICAL EXAMINATION AT A HEALTH CARE FACILITY: ICD-10-CM

## 2024-12-12 LAB
ALBUMIN SERPL BCP-MCNC: 4.2 G/DL (ref 3.4–5)
ALP SERPL-CCNC: 39 U/L (ref 33–136)
ALT SERPL W P-5'-P-CCNC: 16 U/L (ref 7–45)
ANION GAP SERPL CALCULATED.3IONS-SCNC: 10 MMOL/L (ref 10–20)
AST SERPL W P-5'-P-CCNC: 18 U/L (ref 9–39)
BASOPHILS # BLD AUTO: 0.01 X10*3/UL (ref 0–0.1)
BASOPHILS NFR BLD AUTO: 0.3 %
BILIRUB SERPL-MCNC: 0.6 MG/DL (ref 0–1.2)
BUN SERPL-MCNC: 13 MG/DL (ref 6–23)
CALCIUM SERPL-MCNC: 8.9 MG/DL (ref 8.6–10.3)
CHLORIDE SERPL-SCNC: 102 MMOL/L (ref 98–107)
CHOLEST SERPL-MCNC: 216 MG/DL (ref 0–199)
CHOLEST/HDLC SERPL: 2.6 {RATIO}
CO2 SERPL-SCNC: 28 MMOL/L (ref 21–32)
CREAT SERPL-MCNC: 0.69 MG/DL (ref 0.5–1.05)
EGFRCR SERPLBLD CKD-EPI 2021: >90 ML/MIN/1.73M*2
EOSINOPHIL # BLD AUTO: 0.11 X10*3/UL (ref 0–0.7)
EOSINOPHIL NFR BLD AUTO: 3.3 %
ERYTHROCYTE [DISTWIDTH] IN BLOOD BY AUTOMATED COUNT: 11.6 % (ref 11.5–14.5)
GLUCOSE SERPL-MCNC: 88 MG/DL (ref 74–99)
HCT VFR BLD AUTO: 40.8 % (ref 36–46)
HDLC SERPL-MCNC: 82 MG/DL
HGB BLD-MCNC: 13.7 G/DL (ref 12–16)
IMM GRANULOCYTES # BLD AUTO: 0.01 X10*3/UL (ref 0–0.7)
IMM GRANULOCYTES NFR BLD AUTO: 0.3 % (ref 0–0.9)
LDLC SERPL CALC-MCNC: 122 MG/DL
LYMPHOCYTES # BLD AUTO: 1.34 X10*3/UL (ref 1.2–4.8)
LYMPHOCYTES NFR BLD AUTO: 40.2 %
MCH RBC QN AUTO: 31.1 PG (ref 26–34)
MCHC RBC AUTO-ENTMCNC: 33.6 G/DL (ref 32–36)
MCV RBC AUTO: 93 FL (ref 80–100)
MONOCYTES # BLD AUTO: 0.35 X10*3/UL (ref 0.1–1)
MONOCYTES NFR BLD AUTO: 10.5 %
NEUTROPHILS # BLD AUTO: 1.51 X10*3/UL (ref 1.2–7.7)
NEUTROPHILS NFR BLD AUTO: 45.4 %
NON HDL CHOLESTEROL: 134 MG/DL (ref 0–149)
NRBC BLD-RTO: 0 /100 WBCS (ref 0–0)
PLATELET # BLD AUTO: 245 X10*3/UL (ref 150–450)
POTASSIUM SERPL-SCNC: 4.1 MMOL/L (ref 3.5–5.3)
PROT SERPL-MCNC: 7 G/DL (ref 6.4–8.2)
RBC # BLD AUTO: 4.4 X10*6/UL (ref 4–5.2)
SODIUM SERPL-SCNC: 136 MMOL/L (ref 136–145)
TRIGL SERPL-MCNC: 62 MG/DL (ref 0–149)
TSH SERPL-ACNC: 3.19 MIU/L (ref 0.44–3.98)
VLDL: 12 MG/DL (ref 0–40)
WBC # BLD AUTO: 3.3 X10*3/UL (ref 4.4–11.3)

## 2024-12-12 PROCEDURE — 80053 COMPREHEN METABOLIC PANEL: CPT

## 2024-12-12 PROCEDURE — 36415 COLL VENOUS BLD VENIPUNCTURE: CPT

## 2024-12-12 PROCEDURE — 80061 LIPID PANEL: CPT

## 2024-12-12 PROCEDURE — 85025 COMPLETE CBC W/AUTO DIFF WBC: CPT

## 2024-12-12 PROCEDURE — 84443 ASSAY THYROID STIM HORMONE: CPT

## 2024-12-16 ASSESSMENT — ENCOUNTER SYMPTOMS
HEADACHES: 0
CHEST TIGHTNESS: 0
ABDOMINAL DISTENTION: 0
COLOR CHANGE: 0
DYSURIA: 0
DIZZINESS: 0
DIFFICULTY URINATING: 0
SHORTNESS OF BREATH: 0
JOINT SWELLING: 0
ACTIVITY CHANGE: 0
FATIGUE: 0
WEAKNESS: 0
ABDOMINAL PAIN: 0
ADENOPATHY: 0
UNEXPECTED WEIGHT CHANGE: 0

## 2024-12-16 NOTE — PROGRESS NOTES
"Annual-menopause  Subjective   Reymundo Sweet is a 59 y.o. former pt [facility], last exam 2023, who is here for a routine exam. Gyn care w [facility] since , prior to that, [facility].   Complaints:   denies vag bleed or discharge; denies pelvic  pain, pressure, or persistent bloating. 1st uti 2024, followed by mucosal irritation-- s/p weaned off ert; pcp advised pt to resume and sxs resolved. On ert since 2018.  PMHx: GSM; Hx of fibroids, Microcalc  L breast on mamm.  Last pap   NEG.   Mamm 2023 neg.   Last Period .   NOT  sexually active since 2024   Total preg  2.  live births  2.  NVD  2.    , MALE, Alvaro 7LBS. ; [place]   ,  MALE 7LBS. [place] : [place]  Surg Hx: abdom hyst- ovaries remain, ; Dr. Myriam Cole  colonoscopy - neg 2023.  Father: alive 92 yrs,   Mother:  86 yrs, Heart Disease.   Smoking  never. Alcohol 2-4/ month;2 cups coffee daily.   . Living w: Spouse. 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  Sons both local; both in IT field---one with [employer], and one with [employer].  Mother is 90 and recently had valve replacement endovascularly. Father is 95 and they both `now in assisted living  Review of Systems   Constitutional:  Negative for activity change, fatigue and unexpected weight change.   Respiratory:  Negative for chest tightness and shortness of breath.    Cardiovascular:  Negative for chest pain and leg swelling.   Gastrointestinal:  Negative for abdominal distention and abdominal pain.   Genitourinary:  Negative for difficulty urinating, dysuria, genital sores, pelvic pain, vaginal bleeding, vaginal discharge and vaginal pain.   Musculoskeletal:  Negative for gait problem and joint swelling.   Skin:  Negative for color change and rash.   Neurological:  Negative for dizziness, weakness and headaches.   Hematological:  Negative for adenopathy.   Objective Visit Vitals  /68   Ht 1.626 m (5' 4\")   Wt 63 kg (138 lb 12.8 " oz)   BMI 23.82 kg/m²   OB Status Hysterectomy   Smoking Status Never   BSA 1.69 m²       General:   Alert and oriented, in no acute distress   Neck: Supple. No visible thyromegaly.    Breast/Axilla: Normal to palpation bilaterally without masses, skin changes, or nipple discharge.    Abdomen: Soft, non-tender, without masses or organomegaly   Vulva: Normal architecture without erythema, masses, or lesions.    Vagina: Normal mucosa without lesions, masses. No abnormal vaginal discharge.    Cervix: Surg absent   Uterus: Surg absent   Adnexa: No palpable masses or tenderness   Pelvic Floor No POP noted. No high tone pelvic floor    Psych  Rectal Normal affect. Normal mood.      Assessment/Plan   Encounter Diagnoses   Name Primary?    Visit for gynecologic examination; no suspicious findings on breast/pelvic exams. Yes    Encounter for screening mammogram for malignant neoplasm of breast; order placed     Menopause; sxs controlled on ert; rvwd weaning startegies/vag ert option.     Postmenopausal atrophic vaginitis; resolved by ert.     Screen for colon cancer; utd and neg     Shyann Rosas MD

## 2024-12-17 ENCOUNTER — APPOINTMENT (OUTPATIENT)
Dept: PRIMARY CARE | Facility: CLINIC | Age: 59
End: 2024-12-17
Payer: COMMERCIAL

## 2024-12-17 ENCOUNTER — TELEPHONE (OUTPATIENT)
Dept: PRIMARY CARE | Facility: CLINIC | Age: 59
End: 2024-12-17

## 2024-12-17 VITALS
TEMPERATURE: 96.6 F | BODY MASS INDEX: 24.2 KG/M2 | SYSTOLIC BLOOD PRESSURE: 126 MMHG | HEIGHT: 64 IN | HEART RATE: 61 BPM | DIASTOLIC BLOOD PRESSURE: 70 MMHG | OXYGEN SATURATION: 96 %

## 2024-12-17 DIAGNOSIS — N95.1 MENOPAUSAL STATE: ICD-10-CM

## 2024-12-17 DIAGNOSIS — E03.9 HYPOTHYROIDISM, UNSPECIFIED TYPE: ICD-10-CM

## 2024-12-17 DIAGNOSIS — M25.572 LEFT ANKLE PAIN, UNSPECIFIED CHRONICITY: ICD-10-CM

## 2024-12-17 DIAGNOSIS — Z00.00 ROUTINE GENERAL MEDICAL EXAMINATION AT A HEALTH CARE FACILITY: ICD-10-CM

## 2024-12-17 DIAGNOSIS — Z00.00 WELL ADULT EXAM: ICD-10-CM

## 2024-12-17 DIAGNOSIS — E03.9 HYPOTHYROIDISM, UNSPECIFIED TYPE: Primary | ICD-10-CM

## 2024-12-17 DIAGNOSIS — Z13.220 LIPID SCREENING: ICD-10-CM

## 2024-12-17 LAB
POC APPEARANCE, URINE: CLEAR
POC BILIRUBIN, URINE: NEGATIVE
POC BLOOD, URINE: ABNORMAL
POC COLOR, URINE: YELLOW
POC GLUCOSE, URINE: NEGATIVE MG/DL
POC KETONES, URINE: NEGATIVE MG/DL
POC LEUKOCYTES, URINE: NEGATIVE
POC NITRITE,URINE: NEGATIVE
POC PH, URINE: 6.5 PH
POC PROTEIN, URINE: NEGATIVE MG/DL
POC SPECIFIC GRAVITY, URINE: 1.01
POC UROBILINOGEN, URINE: 1 EU/DL

## 2024-12-17 PROCEDURE — 81003 URINALYSIS AUTO W/O SCOPE: CPT | Performed by: FAMILY MEDICINE

## 2024-12-17 PROCEDURE — 99396 PREV VISIT EST AGE 40-64: CPT | Performed by: FAMILY MEDICINE

## 2024-12-17 PROCEDURE — 93000 ELECTROCARDIOGRAM COMPLETE: CPT | Performed by: FAMILY MEDICINE

## 2024-12-17 PROCEDURE — 1036F TOBACCO NON-USER: CPT | Performed by: FAMILY MEDICINE

## 2024-12-17 ASSESSMENT — PROMIS GLOBAL HEALTH SCALE
RATE_QUALITY_OF_LIFE: EXCELLENT
RATE_SOCIAL_SATISFACTION: EXCELLENT
RATE_AVERAGE_PAIN: 1
RATE_MENTAL_HEALTH: EXCELLENT
CARRYOUT_PHYSICAL_ACTIVITIES: COMPLETELY
RATE_PHYSICAL_HEALTH: EXCELLENT
RATE_GENERAL_HEALTH: EXCELLENT
CARRYOUT_SOCIAL_ACTIVITIES: EXCELLENT
EMOTIONAL_PROBLEMS: NEVER
RATE_AVERAGE_FATIGUE: MILD

## 2024-12-17 ASSESSMENT — ENCOUNTER SYMPTOMS
DEPRESSION: 0
OCCASIONAL FEELINGS OF UNSTEADINESS: 0
LOSS OF SENSATION IN FEET: 0

## 2024-12-17 ASSESSMENT — PAIN SCALES - GENERAL: PAINLEVEL_OUTOF10: 0-NO PAIN

## 2024-12-17 ASSESSMENT — PATIENT HEALTH QUESTIONNAIRE - PHQ9
SUM OF ALL RESPONSES TO PHQ9 QUESTIONS 1 AND 2: 0
2. FEELING DOWN, DEPRESSED OR HOPELESS: NOT AT ALL
1. LITTLE INTEREST OR PLEASURE IN DOING THINGS: NOT AT ALL

## 2024-12-17 NOTE — PROGRESS NOTES
Subjective   Patient ID: Reymundo Sweet is a 59 y.o. female who presents for Annual Exam (EKG done 9/2019/Mammogram  11/2023  done thru GYN/Tdap - believes 7-8 years ago per patient/Zoster #1  11/2020  #2 1/2021/Urine done today in the office/Labs done  12/12/2024/Flu vaccine done at work thru [facility]  10/2024/Also had COVID vaccine 10/2024).    HPI   History of Present Illness Here for a comprehensive physical exam. PMH, PSH, family history and social history were reviewed and updated.   She has gynecological care. She is UTD with cervical and breast cancer screenings.   Pt has Hypothyroidism: She is on levothyroxine 50 micrograms daily. She had been seen by an endocrinologist who left the area. Care is managed by this office.  Patient is postmenopausal. Her gynecologist has her on estradiol. She recently tried to come off medication but had significant menopausal symptoms.   Patient has rosacea. She sees Dermatology at [facility]. She is on Sulantra with good result.   Colonoscopy was done in…  Patient has been immunized against coronavirus x 7.  She has been immunized against shingles x 2.  She receives yearly influenza immunizations.  Her most recent tetanus immunization was in 1999.  Patient does not use tobacco.  Patient does not drink alcohol.    Review of Systems  GENERAL: denies lack of energy, unexplained weight gain or weight loss, loss of appetite, fever, night sweats.  HEENT: denies difficulty with hearing, sinus problems, runny nose, post-nasal drip, ringing in ears, mouth sores, loose teeth, ear pain, nosebleeds, sore throat, facial pain or numbness.  CV: denies irregular heartbeat, racing heart, chest pains, swelling of feet or legs, pain in legs with walking.  RESPIRATORY: denies shortness of breath, prolonged cough, wheezing, sputum production, prior tuberculosis, pleurisy, oxygen at home, coughing up blood.  GI: denies heartburn, constipation, intolerance to certain foods, diarrhea,  "abdominal pain, difficulty swallowing, nausea, vomiting, blood in stools, unexplained change in bowel habits, incontinence.  : denies painful urination, frequent urination, urgency, bladder problems.  MS: positive for arthritis in the thumbs at the CMCs.  She is also positive for intermittent pain in the mortise of her ankles left worse than right.  Denies joint pain, aching muscles, swelling of joints, joint deformities, back pain.  INTEGUMENT: denies persistent rash, itching, new skin lesion, change in existing skin lesion, hair loss or increase, breast changes.  NEUROLOGIC: denies frequent headaches, double vision, weakness, change in sensation, problems with walking or balance, dizziness, tremor, loss of consciousness, uncontrolled motions, episodes of visual loss.  PSYCHIATRIC: denies insomnia, irritability, depression, anxiety, recurrent bad thoughts.  ENDOCRINOLOGIC: denies intolerance to heat or cold, menstrual irregularities, frequent hunger/urination/thirst.  HEMATOLOGIC: denies easy bleeding, easy bruising, anemia, leukemia, unexplained swollen areas.  ALLERGIC/IMMUNOLOGIC: denies seasonal allergies, hay fever symptoms, itching, frequent infections.   Objective   /70 (BP Location: Left arm, Patient Position: Sitting, BP Cuff Size: Adult)   Pulse 61   Temp 35.9 °C (96.6 °F) (Temporal)   Ht 1.626 m (5' 4\")   SpO2 96%   BMI 24.20 kg/m²     Physical Exam  General appearance: Well developed, in no acute distress.  Skin: No moles or rashes seen.  Patient has 3 skin tags at the level of the bra in front and on the left side.  Using liquid nitrogen each was frozen to a halo like 3 times patient tolerated this well.  HEENT: The sclerae were anicteric and conjunctivae were pink and moist. Extraocular movements were intact and pupils were equal, round, and reactive to light with normal accommodation. External inspection of the ears and nose showed no scars, lesions, or masses. EACs clear, TMs " translucent, ossicles normal appearance, hearing intact. Nasal mucosa non-inflamed, turbinates normal. Lips, teeth, and gums showed normal mucosa. The oral mucosa, hard and soft palate, tongue and posterior pharynx were normal.  Neck: Supple and symmetric. There was no thyroid enlargement, and no tenderness, or masses were felt.   Lungs: Auscultation of the lungs revealed normal breath sounds without any other adventitious sounds.  Cardiovascular: There was a regular rate and rhythm without any murmurs, gallops, or rubs. There were no carotid bruits. Peripheral pulses were 2+ and symmetric.  Abdomen: Soft and nontender with normal bowel sounds. The liver was not enlarged or tender. The spleen was not palpable. There was no umbilical hernia noted. No ascites was noted.  Lymph nodes: No lymphadenopathy was appreciated in the neck.  Musculoskeletal: There was no tenderness or effusions noted to the mortise space of each ankle.  Muscle strength and tone were normal.   Extremities: No cyanosis, clubbing, or edema.  Neurologic: Alert and oriented x 3. Normal affect. Normal deep tendon reflexes with no pathological reflexes. Sensation to touch was normal.   Rectal: Deferred.  Breasts: Deferred.  Gynecological: Deferred.   Assessment/Plan   Problem List Items Addressed This Visit             ICD-10-CM    Menopausal state   chronic, stable.  Patient's not had any urinary discomfort since she  restarted her estrogen N95.1    Hypothyroidism - Primary stable.  Continue on levothyroxine 50 mg micrograms daily.  She takes his medication 6 days a week. E03.9     Other Visit Diagnoses         Codes    Routine general medical examination at a health care facility    normal exam. Z00.00    Relevant Orders    POCT UA Automated manually resulted (Completed)    ECG 12 Lead (Completed)    Left ankle pain, unspecified chronicity    normal exam.  Patient has a referral to an orthopedic foot specialist. M25.572

## 2024-12-18 ENCOUNTER — APPOINTMENT (OUTPATIENT)
Dept: OBSTETRICS AND GYNECOLOGY | Facility: CLINIC | Age: 59
End: 2024-12-18
Payer: COMMERCIAL

## 2024-12-18 ENCOUNTER — HOSPITAL ENCOUNTER (OUTPATIENT)
Dept: RADIOLOGY | Facility: CLINIC | Age: 59
Discharge: HOME | End: 2024-12-18
Payer: COMMERCIAL

## 2024-12-18 ENCOUNTER — OFFICE VISIT (OUTPATIENT)
Dept: OBSTETRICS AND GYNECOLOGY | Facility: CLINIC | Age: 59
End: 2024-12-18
Payer: COMMERCIAL

## 2024-12-18 VITALS
SYSTOLIC BLOOD PRESSURE: 110 MMHG | HEIGHT: 64 IN | BODY MASS INDEX: 23.7 KG/M2 | WEIGHT: 138.8 LBS | DIASTOLIC BLOOD PRESSURE: 68 MMHG

## 2024-12-18 DIAGNOSIS — Z12.31 ENCOUNTER FOR SCREENING MAMMOGRAM FOR MALIGNANT NEOPLASM OF BREAST: ICD-10-CM

## 2024-12-18 DIAGNOSIS — N95.2 POSTMENOPAUSAL ATROPHIC VAGINITIS: ICD-10-CM

## 2024-12-18 DIAGNOSIS — Z12.11 SCREEN FOR COLON CANCER: ICD-10-CM

## 2024-12-18 DIAGNOSIS — Z78.0 MENOPAUSE: ICD-10-CM

## 2024-12-18 DIAGNOSIS — Z01.419 VISIT FOR GYNECOLOGIC EXAMINATION: Primary | ICD-10-CM

## 2024-12-18 PROCEDURE — 3008F BODY MASS INDEX DOCD: CPT | Performed by: OBSTETRICS & GYNECOLOGY

## 2024-12-18 PROCEDURE — 77063 BREAST TOMOSYNTHESIS BI: CPT | Performed by: RADIOLOGY

## 2024-12-18 PROCEDURE — 99396 PREV VISIT EST AGE 40-64: CPT | Performed by: OBSTETRICS & GYNECOLOGY

## 2024-12-18 PROCEDURE — 77067 SCR MAMMO BI INCL CAD: CPT | Performed by: RADIOLOGY

## 2024-12-18 PROCEDURE — 1036F TOBACCO NON-USER: CPT | Performed by: OBSTETRICS & GYNECOLOGY

## 2024-12-18 PROCEDURE — 77067 SCR MAMMO BI INCL CAD: CPT

## 2024-12-18 RX ORDER — ESTRADIOL 0.5 MG/1
0.5 TABLET ORAL DAILY
Qty: 90 TABLET | Refills: 3 | Status: SHIPPED | OUTPATIENT
Start: 2024-12-18 | End: 2025-12-18

## 2024-12-18 ASSESSMENT — PATIENT HEALTH QUESTIONNAIRE - PHQ9
1. LITTLE INTEREST OR PLEASURE IN DOING THINGS: NOT AT ALL
2. FEELING DOWN, DEPRESSED OR HOPELESS: NOT AT ALL
SUM OF ALL RESPONSES TO PHQ9 QUESTIONS 1 & 2: 0

## 2024-12-18 ASSESSMENT — LIFESTYLE VARIABLES
HOW MANY STANDARD DRINKS CONTAINING ALCOHOL DO YOU HAVE ON A TYPICAL DAY: 1 OR 2
SKIP TO QUESTIONS 9-10: 1
HOW OFTEN DO YOU HAVE A DRINK CONTAINING ALCOHOL: MONTHLY OR LESS
AUDIT-C TOTAL SCORE: 1
HOW OFTEN DO YOU HAVE SIX OR MORE DRINKS ON ONE OCCASION: NEVER

## 2024-12-18 ASSESSMENT — ENCOUNTER SYMPTOMS
OCCASIONAL FEELINGS OF UNSTEADINESS: 0
LOSS OF SENSATION IN FEET: 0
DEPRESSION: 0

## 2024-12-18 ASSESSMENT — PAIN SCALES - GENERAL: PAINLEVEL_OUTOF10: 0-NO PAIN

## 2025-01-10 ENCOUNTER — HOSPITAL ENCOUNTER (OUTPATIENT)
Dept: RADIOLOGY | Facility: CLINIC | Age: 60
Discharge: HOME | End: 2025-01-10
Payer: COMMERCIAL

## 2025-01-10 ENCOUNTER — OFFICE VISIT (OUTPATIENT)
Dept: ORTHOPEDIC SURGERY | Facility: CLINIC | Age: 60
End: 2025-01-10
Payer: COMMERCIAL

## 2025-01-10 DIAGNOSIS — M25.872 IMPINGEMENT SYNDROME OF LEFT ANKLE: Primary | ICD-10-CM

## 2025-01-10 DIAGNOSIS — M79.672 LEFT FOOT PAIN: ICD-10-CM

## 2025-01-10 DIAGNOSIS — M19.072 ARTHRITIS OF LEFT MIDFOOT: ICD-10-CM

## 2025-01-10 PROCEDURE — 73610 X-RAY EXAM OF ANKLE: CPT | Mod: LT

## 2025-01-10 PROCEDURE — 73630 X-RAY EXAM OF FOOT: CPT | Mod: LT

## 2025-01-10 PROCEDURE — 99213 OFFICE O/P EST LOW 20 MIN: CPT | Performed by: STUDENT IN AN ORGANIZED HEALTH CARE EDUCATION/TRAINING PROGRAM

## 2025-01-10 ASSESSMENT — PAIN DESCRIPTION - DESCRIPTORS: DESCRIPTORS: ACHING;DISCOMFORT;SHOOTING;SHARP

## 2025-01-10 ASSESSMENT — PAIN - FUNCTIONAL ASSESSMENT: PAIN_FUNCTIONAL_ASSESSMENT: 0-10

## 2025-01-10 ASSESSMENT — PAIN SCALES - GENERAL: PAINLEVEL_OUTOF10: 8

## 2025-01-10 NOTE — PROGRESS NOTES
ORTHOPAEDIC SURGERY OUTPATIENT PROGRESS NOTE    Chief Complaint:  Left foot and ankle pain    History Of Present Illness  Reymundo Sweet is a 59 y.o. female who presents for evaluation of left foot and ankle pain.  Patient follows with Dr. Junior for thumb arthritis.  She has noticed intermittent left foot and ankle pain for several years.  She can usually feel the pain starting and then must flex and extend her ankle for improvement.  She describes a sensation of feeling tight and improved with motion.  She denies any specific injury.  She has had ankle sprains in the past but none recently she remains quite active.  Patient denies any mechanical symptoms.  An exercise.  Patient denies any significant changes in activity or shoewear.  She is reporting minimal pain today but does experience 8 out of 10 pain at its worst.     Past Medical History  Past Medical History:   Diagnosis Date    Anxiety     Fibroid     Hypothyroidism        Surgical History  Recent Surgeries in Orthopaedic Surgery            No cases to display             Social History  Social History     Socioeconomic History    Marital status:    Tobacco Use    Smoking status: Never    Smokeless tobacco: Never   Vaping Use    Vaping status: Never Used   Substance and Sexual Activity    Alcohol use: Yes     Comment: rare    Drug use: Never    Sexual activity: Yes     Partners: Male     Birth control/protection: Post-menopausal       Family History  Family History   Problem Relation Name Age of Onset    No Known Problems Mother      No Known Problems Father      Breast cancer Neg Hx      Ovarian cancer Neg Hx          Allergies  Allergies   Allergen Reactions    Adhesive Tape-Silicones Rash     BANDAIDS    Latex Unknown, Other and Rash       Review of Systems  REVIEW OF SYSTEMS  Constitutional: no unplanned weight loss  Psychiatric: no suicidal ideation  ENT: no vision changes, no sinus problems  Pulmonary: no shortness of breath  Lymphatic: no  enlarged lymph nodes  Cardiovascular: no chest pain or shortness of breath  Gastrointestinal: no stomach problems  Genitourinary: no dysuria   Skin: no rashes  Endocrine: no thyroid problems  Neurological: no headache, no numbness  Hematological: no easy bruising  Musculoskeletal: Left foot and ankle pain     Physical Exam  PHYSICAL EXAMINATION  Constitutional Exam: well developed and well nourished  Psychiatric Exam: alert and oriented, appropriate mood and behavior  Eye Exam: EOMI  Pulmonary Exam: breathing non-labored, no apparent distress  Lymphatic exam: no appreciable lymphadenopathy in the lower extremities  Cardiovascular exam: RRR to peripheral palpation, DP pulses 2+, PT 2+, toes are pink with good capillary refill, no pitting edema  Skin exam: no open lesions, rashes, abrasions or ulcerations  Neurological exam: sensation to light touch intact in both lower extremities in peripheral and dermatomal distributions (except for any abnormalities noted in musculoskeletal exam)    Musculoskeletal exam: Left lower extremity examination.  Patient pain localized to the ankle and proximal tarsal bones.  She has mild tenderness to palpation on examination today.  She is explicitly nontender to palpation overlying the lateral process of the talus as well as the anterior process calcaneus, she does have tenderness to palpation about the TN.  She has a positive anterolateral ankle impingement test.  No significant pain with ankle, subtalar or midtarsal joint range of motion. Patient has sensation intact to light touch grossly in a saphenous, sural, superficial peroneal, deep peroneal and tibial nerve distribution.  Patient has 5 out of 5 strength in plantarflexion, dorsiflexion and EHL. Patient has 2+ DP/PT pulse palpated.  Positive anterior drawer, negative talar tilt.     Last Recorded Vitals  There were no vitals taken for this visit.    Laboratory Results  No results found for this or any previous visit (from the  past 24 hours).     Radiology Results  X-ray imaging 3 view weightbearing left foot and ankle reviewed and independently evaluated by me on 01/10/2025 demonstrates no acute fracture or dislocation.  Ankle mortise appears well aligned.  There is perhaps mild decreased joint space of the TNJ with noted dorsal navicular osteophytosis and subchondral sclerosis.  There is also a anterior distal tibial osteophyte, likely suggestive of ankle impingement.    Assessment/Plan:  59-year-old female who my impression has left foot and ankle pain likely secondary to anterior ankle impingement, less favored as ankle instability or symptomatic midfoot osteoarthritis.  I have reviewed the diagnosis and treatment options extensively with the patient.  I would recommend the patient continue weightbearing to her tolerance in her left lower extremity.  Have no formal restrictions for her.  She may certainly utilize a topical anti-inflammatory for symptomatic relief.  I discussed a trial of tibiotalar corticosteroid or lidocaine injection test during a pain flare in order to further localize her pain.  The patient will plan to contact the office if she has worsening pain for an injection visit.  Upon return, patient would not require further imaging unless symptoms would warrant.  For an injection visit.  Upon return, patient would not require further imaging unless symptoms were performed.    Bernabe Red MD, ROSA  Department of Orthopaedic Surgery  [facility]

## 2025-02-06 NOTE — PROGRESS NOTES
[facility]  Hand and Upper Extremity Service  Follow up visit         Follow up for: Bilateral thumbs     Interval History: She returns for her bilateral thumbs. She received bilateral thumb CMC injections at last visit which were helpful but in the beginning of January when it was cold, her symptoms started to return. She has pain with any pinching maneuver with her bilateral hands. She's here for repeat injections and isn't ready to consider any surgical intervention for this problem.               Past medical history, medications, allergies, surgical history and review of systems are reviewed and otherwise unchanged when compared to last visit on 10/7/24         Examination:  Constitutional: Oriented to person, place, and time.  Appears well-developed and well-nourished.  Head: Normocephalic and atraumatic.  Eyes: Pupils are equal, round, and reactive to light.  Cardiovascular: Intact distal pulses.  Pulmonary/Chest/Breast: Effort normal. No respiratory distress.  Neurological: Alert and oriented to person, place, and time.  Skin: Skin is warm and dry.  Psychiatric: normal mood and affect.  Behavior is normal.  Musculoskeletal: Bilateral hands reveal arthritic changes at the base of thumb. Positive thumb CMC grind test without MP joint instability. No thenar atrophy. No triggering.       Impression: Bilateral thumb CMC arthritis       Plan: We've given her bilateral thumb CMC joint injections. She'll continue with her bracing and other over the counter remedies. We discussed potential alternative bracing options should she be interested in purchasing them.       In Office Procedures Performed: Bilateral thumb CMC joint injections  S Inj/Asp: bilateral thumb CMC on 2/10/2025 12:38 PM  Indications: pain  Details: 25 G needle, dorsal approach  Medications (Right): 20 mg triamcinolone acetonide 40 mg/mL; 0.5 mL lidocaine 10 mg/mL (1 %)  Medications (Left): 20 mg triamcinolone  acetonide 40 mg/mL; 0.5 mL lidocaine 10 mg/mL (1 %)  Outcome: tolerated well, no immediate complications  Procedure, treatment alternatives, risks and benefits explained, specific risks discussed. Consent was given by the patient. Immediately prior to procedure a time out was called to verify the correct patient, procedure, equipment, support staff and site/side marked as required. Patient was prepped and draped in the usual sterile fashion.             Follow up: As needed              Alvaro Junior MD  [facility]  Department of Orthopaedic Surgery  Hand and Upper Extremity Reconstruction    Scribe Attestation  By signing my name below, I, Susu Ramsey , Scribrobert   attest that this documentation has been prepared under the direction and in the presence of Dr. Alvaro Junior.    Dictation performed with the use of voice recognition software.  Syntax and grammatical errors may exist.

## 2025-02-10 ENCOUNTER — APPOINTMENT (OUTPATIENT)
Dept: ORTHOPEDIC SURGERY | Facility: CLINIC | Age: 60
End: 2025-02-10
Payer: COMMERCIAL

## 2025-02-10 VITALS — WEIGHT: 138 LBS | HEIGHT: 64 IN | BODY MASS INDEX: 23.56 KG/M2

## 2025-02-10 DIAGNOSIS — M19.049 CMC ARTHRITIS: Primary | ICD-10-CM

## 2025-02-10 PROCEDURE — 20600 DRAIN/INJ JOINT/BURSA W/O US: CPT | Performed by: ORTHOPAEDIC SURGERY

## 2025-02-10 PROCEDURE — 3008F BODY MASS INDEX DOCD: CPT | Performed by: ORTHOPAEDIC SURGERY

## 2025-02-10 PROCEDURE — 1036F TOBACCO NON-USER: CPT | Performed by: ORTHOPAEDIC SURGERY

## 2025-02-10 PROCEDURE — 99213 OFFICE O/P EST LOW 20 MIN: CPT | Performed by: ORTHOPAEDIC SURGERY

## 2025-02-10 RX ORDER — LIDOCAINE HYDROCHLORIDE 10 MG/ML
0.5 INJECTION, SOLUTION INFILTRATION; PERINEURAL
Status: COMPLETED | OUTPATIENT
Start: 2025-02-10 | End: 2025-02-10

## 2025-02-10 RX ORDER — TRIAMCINOLONE ACETONIDE 40 MG/ML
20 INJECTION, SUSPENSION INTRA-ARTICULAR; INTRAMUSCULAR
Status: COMPLETED | OUTPATIENT
Start: 2025-02-10 | End: 2025-02-10

## 2025-02-10 RX ADMIN — TRIAMCINOLONE ACETONIDE 20 MG: 40 INJECTION, SUSPENSION INTRA-ARTICULAR; INTRAMUSCULAR at 12:38

## 2025-02-10 RX ADMIN — LIDOCAINE HYDROCHLORIDE 0.5 ML: 10 INJECTION, SOLUTION INFILTRATION; PERINEURAL at 12:38

## 2025-02-10 ASSESSMENT — PAIN - FUNCTIONAL ASSESSMENT: PAIN_FUNCTIONAL_ASSESSMENT: 0-10

## 2025-02-10 ASSESSMENT — PAIN SCALES - GENERAL: PAINLEVEL_OUTOF10: 5 - MODERATE PAIN

## 2025-02-10 ASSESSMENT — PAIN DESCRIPTION - DESCRIPTORS: DESCRIPTORS: ACHING;SORE

## 2025-02-14 ENCOUNTER — TELEPHONE (OUTPATIENT)
Dept: PRIMARY CARE | Facility: CLINIC | Age: 60
End: 2025-02-14
Payer: COMMERCIAL

## 2025-02-14 NOTE — TELEPHONE ENCOUNTER
Patient had her CPE on 12-17-24 she wasn't feeling well that day and so she didn't get a Tetanus shot. Patient will be traveling to the [place] and would like to get the Tetanus shot before she leaves. Is it OK to schedule?    Please advise    Patient can be reached at [phone]

## 2025-02-15 DIAGNOSIS — Z23 IMMUNIZATION DUE: Primary | ICD-10-CM

## 2025-02-17 NOTE — TELEPHONE ENCOUNTER
Patient would like to know if [facility] for insurance purposes would administer her vaccine. Patient contacted her insurance and they stated it would only be paid if the Vaccine is administered by an MD.    Please advise    Patient can be reached at [phone]

## 2025-02-17 NOTE — TELEPHONE ENCOUNTER
Patient called and stated she is going to check with [facility], Patient thinks her Tdap vaccine must be administered by a MD or RN. Patient stated she will call back after she checks with Insurance company.

## 2025-02-25 ENCOUNTER — APPOINTMENT (OUTPATIENT)
Dept: PRIMARY CARE | Facility: CLINIC | Age: 60
End: 2025-02-25
Payer: COMMERCIAL

## 2025-02-25 VITALS
DIASTOLIC BLOOD PRESSURE: 73 MMHG | SYSTOLIC BLOOD PRESSURE: 110 MMHG | TEMPERATURE: 97.9 F | BODY MASS INDEX: 23.56 KG/M2 | HEIGHT: 64 IN | OXYGEN SATURATION: 95 % | HEART RATE: 68 BPM | WEIGHT: 138 LBS

## 2025-02-25 DIAGNOSIS — Z23 IMMUNIZATION DUE: Primary | ICD-10-CM

## 2025-02-25 PROBLEM — M77.00 MEDIAL EPICONDYLITIS OF ELBOW: Status: RESOLVED | Noted: 2025-02-25 | Resolved: 2025-02-25

## 2025-02-25 PROCEDURE — 90471 IMMUNIZATION ADMIN: CPT | Performed by: FAMILY MEDICINE

## 2025-02-25 PROCEDURE — 1036F TOBACCO NON-USER: CPT | Performed by: FAMILY MEDICINE

## 2025-02-25 PROCEDURE — 3008F BODY MASS INDEX DOCD: CPT | Performed by: FAMILY MEDICINE

## 2025-02-25 PROCEDURE — 99212 OFFICE O/P EST SF 10 MIN: CPT | Performed by: FAMILY MEDICINE

## 2025-02-25 PROCEDURE — 90715 TDAP VACCINE 7 YRS/> IM: CPT | Performed by: FAMILY MEDICINE

## 2025-02-25 NOTE — PROGRESS NOTES
"Subjective   Patient ID: Reymundo Sweet is a 59 y.o. female who presents for Injections (Pt is here for t-dap vaccine ).    HPI patient will be traveling out of the country in the near future.  She has not had a tetanus shot done in more than 10 years.  Her insurance mandates that a physician  her injection.  She has never had an adverse reaction to her immunizations.    Review of Systems  Constitution: Patient is negative for fever, fatigue, weight change.  HEENT: Patient is never change in vision, hearing, swallow.  Cardio: Patient is negative for chest pain, extremity edema.  Pulmonary: Patient is negative for cough, shortness of breath.  Objective   /73 (BP Location: Left arm, Patient Position: Sitting, BP Cuff Size: Small adult)   Pulse 68   Temp 36.6 °C (97.9 °F) (Oral)   Ht 1.626 m (5' 4\")   Wt 62.6 kg (138 lb)   SpO2 95%   BMI 23.69 kg/m²     Physical Exam  General: Awake and alert no apparent distress.  HEENT: Moist oral mucosa no cervical lymphadenopathy.  Cardio: Heart S1-S2 no murmur rub or gallop.  Pulmonary: Lungs clear to auscultation bilaterally.  Assessment/Plan   Problem List Items Addressed This Visit    None  Visit Diagnoses         Codes    Immunization due    -  Primary needs better control.  Tetanus immunization was documented and administered by me. Z23    Relevant Orders    Tdap vaccine, age 7 years and older  (BOOSTRIX) (Completed)               "

## 2025-03-07 ENCOUNTER — APPOINTMENT (OUTPATIENT)
Dept: PRIMARY CARE | Facility: CLINIC | Age: 60
End: 2025-03-07
Payer: COMMERCIAL

## 2025-03-10 ENCOUNTER — TELEPHONE (OUTPATIENT)
Dept: PRIMARY CARE | Facility: CLINIC | Age: 60
End: 2025-03-10
Payer: COMMERCIAL

## 2025-03-10 DIAGNOSIS — S89.90XD KNEE INJURY, UNSPECIFIED LATERALITY, SUBSEQUENT ENCOUNTER: ICD-10-CM

## 2025-03-10 NOTE — TELEPHONE ENCOUNTER
Pt called [phone]  she has a knee injury from exercising/cycling- she would like a referral to a ortho Dr, who does [facility] recommend

## 2025-03-12 DIAGNOSIS — S89.90XD KNEE INJURY, UNSPECIFIED LATERALITY, SUBSEQUENT ENCOUNTER: Primary | ICD-10-CM

## 2025-03-12 NOTE — TELEPHONE ENCOUNTER
Left voice message with information regarding referral to ortho-  Dr. Kei Carpenter [phone] per [facility].  Also mailed copy of referral.  Told patient to call if she has any questions.

## 2025-03-17 ENCOUNTER — HOSPITAL ENCOUNTER (OUTPATIENT)
Dept: RADIOLOGY | Facility: CLINIC | Age: 60
Discharge: HOME | End: 2025-03-17
Payer: COMMERCIAL

## 2025-03-17 ENCOUNTER — OFFICE VISIT (OUTPATIENT)
Dept: ORTHOPEDIC SURGERY | Facility: CLINIC | Age: 60
End: 2025-03-17
Payer: COMMERCIAL

## 2025-03-17 DIAGNOSIS — M70.50 PES ANSERINE BURSITIS: ICD-10-CM

## 2025-03-17 DIAGNOSIS — M25.562 ACUTE PAIN OF LEFT KNEE: ICD-10-CM

## 2025-03-17 DIAGNOSIS — S89.90XD KNEE INJURY, UNSPECIFIED LATERALITY, SUBSEQUENT ENCOUNTER: ICD-10-CM

## 2025-03-17 DIAGNOSIS — M17.12 ARTHRITIS OF LEFT KNEE: ICD-10-CM

## 2025-03-17 PROCEDURE — 99214 OFFICE O/P EST MOD 30 MIN: CPT | Performed by: STUDENT IN AN ORGANIZED HEALTH CARE EDUCATION/TRAINING PROGRAM

## 2025-03-17 PROCEDURE — 73564 X-RAY EXAM KNEE 4 OR MORE: CPT | Mod: LEFT SIDE | Performed by: RADIOLOGY

## 2025-03-17 PROCEDURE — 99204 OFFICE O/P NEW MOD 45 MIN: CPT | Performed by: STUDENT IN AN ORGANIZED HEALTH CARE EDUCATION/TRAINING PROGRAM

## 2025-03-17 PROCEDURE — 73564 X-RAY EXAM KNEE 4 OR MORE: CPT | Mod: LT

## 2025-03-17 NOTE — PROGRESS NOTES
Sports Medicine Office Note    Today's Date:  03/17/2025     HPI: Reymundo Sweet is a 59 y.o. Female with PMHx of arthritis (s/p BL thumb injections 2/10/25), ankle/foot arthritis who presents today for evaluation of left knee pain.  She states her pain started roughly 2 weeks ago after a cycling class, but denies any specific injury/trauma, stating her pain started that evening into the next day.  She has not been to a cycling class in quite some time but is overrall active. She did feel like the instructor was particularly aggressive in class that day. She describes her pain as medial and anterior, initially worse with cycling and leg extension.  She denies any mechanical locking/catching.  Denies any known swelling or any redness, warmth, bruising, numbness, tingling, weakness.  States pain occasionally radiates proximally over medial thigh.  She can flex and extend without issue but pain is worse with ER. She has been doing ice, heat, ibuprofen with minimal relief. She did initially have a limp with her walk and still feels like she has a small limp. Pain was initially a 6/10, she does occasionally get a burning sensation in the area and will sometimes flare up to a 7/10 but states she has had some improvement of her pain since initial onset.  She did try a compression sleeve without relief.  Denies any previous known injury or surgeries to her left knee.    She has no other complaints.    Physical Examination:     The LEFT knee is without obvious signs of acute bony deformity, erythema, ecchymosis.  There is trace to grade 1 joint effusion.  The patella is without tenderness. Apprehension is negative with medial and lateral glide. Patella crepitus is positive. Patella grind is positive. The medial joint line is tender and without bony crepitus or step-off. The lateral joint line is nontender and without bony crepitus or step-off. Flexion & extension are full and symmetrical. Varus & valgus stress test at 0°  and 30° of flexion elicited mild medial joint line laxity with minimal associated pain that improved with return to neutral position. Lachman's and posterior drawer are negative.  Positive Yanira's.  There is tenderness along pes anserine tendons.  Mild pain elicited with resisted knee extension/abduction.  The opposite knee is nontender and stable. Gait is minimally painful, slightly antalgic, and tandem.     Imaging:  Radiographs of the left knee obtained today were reviewed and revealed mild to moderate DJD, primarily in medial and patellofemoral compartments with associated spurring and chondral loose body formation, otherwise no evidence of acute osseous abnormalities.    The studies were reviewed by me personally in the office today.    Problem List Items Addressed This Visit    None  Visit Diagnoses         Codes    Acute pain of left knee     M25.562    Relevant Orders    XR knee left 4+ views    Knee injury, unspecified laterality, subsequent encounter     S89.90XD    Pes anserine bursitis     M70.50    Relevant Orders    Referral to Physical Therapy    Arthritis of left knee     M17.12    Relevant Orders    Referral to Physical Therapy          Assessment and Plan:    We reviewed the exam and imaging findings and discussed the conservative and surgical treatment options. We agreed to start with conservative management for left knee OA with acute pain flare in addition to pes anserine tendinitis.  Discussed activity modifications.  Physical therapy referral provided and discussed the importance of regular home exercises.  Recommended prescription doses of Tylenol, Voltaren gel, and encouraged use of ice or heat as needed for acute flares of pain.  She may otherwise continue OTC anti-inflammatory such as ibuprofen up to 400-600 mg 3 times daily with food as needed for acute flares of pain.  Discussed that we may consider knee bracing (particularly reaction OA medial ) and cortisone injection,  however these interventions were deferred at this time as she is already having some improvement with conservative measures.  Plan for follow-up in 3 months for re-evaluation, otherwise may follow-up sooner if any new concerns arise.  Discussed this plan with the patient who is understanding and agreeable.    **This note was dictated using Dragon speech recognition software and was not corrected for spelling or grammatical errors**.    Singh Renee,   Primary Care Sports Medicine  [facility]

## 2025-03-18 ENCOUNTER — EVALUATION (OUTPATIENT)
Dept: PHYSICAL THERAPY | Facility: CLINIC | Age: 60
End: 2025-03-18
Payer: COMMERCIAL

## 2025-03-18 DIAGNOSIS — M25.562 ACUTE PAIN OF LEFT KNEE: Primary | ICD-10-CM

## 2025-03-18 DIAGNOSIS — M17.12 ARTHRITIS OF LEFT KNEE: ICD-10-CM

## 2025-03-18 DIAGNOSIS — M70.50 PES ANSERINE BURSITIS: ICD-10-CM

## 2025-03-18 PROCEDURE — 97161 PT EVAL LOW COMPLEX 20 MIN: CPT | Mod: GP | Performed by: PHYSICAL THERAPIST

## 2025-03-18 PROCEDURE — 97110 THERAPEUTIC EXERCISES: CPT | Mod: GP | Performed by: PHYSICAL THERAPIST

## 2025-03-18 ASSESSMENT — ENCOUNTER SYMPTOMS
LOSS OF SENSATION IN FEET: 0
DEPRESSION: 0
OCCASIONAL FEELINGS OF UNSTEADINESS: 0

## 2025-03-18 NOTE — PROGRESS NOTES
"Physical Therapy    Physical Therapy Evaluation and Treatment    Patient Name: Reymundo Sweet  MRN: [id number]  Encounter Date: 3/18/2025   ANTH SZQ - NO AUTH / $40 COPAY / $3250 OOP not met / 60V pt/ot/st comb - 0 used / AVAILITY [id number] / ds 3/17/25.    Visits:  Combined Inst/Prof, In/Out network, PT/OT/ST //  Time Calculation  Start Time: 0740  Stop Time: 0819  Time Calculation (min): 39 min    Current Problem:   1. Acute pain of left knee  Follow Up In Physical Therapy      2. Pes anserine bursitis  Referral to Physical Therapy      3. Arthritis of left knee  Referral to Physical Therapy          Relevant Past Medical History:per EMR B/L thumb injections 2/10/25, ankle/foot arthritis,anxiety,medial epicondylitis,hypothyroidism,overweight  Surgical History: Breast biopsy, hysterectomy   Medications: reviewed on EMR  Allergies: :LATEX/ADHESIVE  Precautions: LATEX/ADHESIVE  STEADI Fall Risk: 0 (score of 4+ indicates fall risk)       SUBJECTIVE:   Pt is a 58 yo female c/o LT medial/anterior knee pain, DOI-2 weeks ago, GAL-evening post cycling class.  Pt denies: swelling, numbness, tingling, weakness, lock/catch, back/hip pain.    Imaging:    XR: \"mild to moderate DJD, primarily medial and patellofemoral compartments with associated spurring and chondral loose body formation, otherwise no evidence of acute osseous abnormalities.\"     Pain:   Current: 0/10  Lowest: 0/10  Highest: 1/10  Location: ant med knee  Onset: 2 weeks ago  Description: intermittent throb or burn  Aggravating Factors: add hip hip   Relieving Factors: ice, heat, stretch  Sleep Pattern: supine, side, mildly disturbed  Previous Interventions: none       Red flags: neg    Occupation: office sit stand desk-stand better   Hobbies: cylce class   Home Living: , ranch-one step in, works 3rd floor-down painful, ascend ok, doing step over step   Current Level of Function: impaired with stairs, long walks   Prior Level of Function: no " limitations    Patient/Family Goal: resume cardio and cycle bike   Yoga, interval cardio-low impact with wts   Outcome Measures: WOMAC 14/96 14.5%  Other Measures  Other Outcome Measures: WOMAC 14/96    OBJECTIVE:    Cognition: intact  Posture: no excessive pronation    Gait: decreased stance time on left  Functional Mobility: sit to stand with 1 UE assist   Joint Mobility:     HIP ROM WNL  KNEE ROM  0-135 pulls medial knee bilateral     MMT STRENGTH: RT  Hip Abd 5  Hip ER 4 pain  Hip add 5  Hams 4+  Quad 4+/5    Supine   SPECIAL TESTS  Apprehension -  PF grind +pain retro patellar  Varus -  Valgus -  Lachman -  Post sag sign -  Yanira - crepitus no lock or pain   Palpation:tender pes anserine and medial joint line, negative;lateral joint line, PT, QT    Treatments:  Therapeutic Exercise: (10 minutes)   Hamstring stretch 20 sec x3 in long sit  Quad set with towel roll x10 without painful retro patellar  Supine SLR  Clam trial  painful medial knee   S/L hip abd SLR x10  Pt  has also been doing groin and butterfly-knee not to close to body stretching  Manual Therapy: ( minutes)    Gait Training: ( minutes)    Neuromuscular Re-education: ( minutes)    Therapeutic Activity: (3 minutes)  Pt goals, heat/cold pt finding heat better, zainab to avoid, rx POC,pt goal  cycle class-discussed when returning not standing or heavy tension.    OP Education: see TA     HEP:all zainab as tolerated   Access Code: [email or web address]  URL: [email or web address]  Date: 03/18/2025  Prepared by: Susan Santana    Exercises  - Sidelying Hip Abduction  - 1 x daily - 7 x weekly - 3 sets - 10 reps  - Supine Quad Set  - 1 x daily - 7 x weekly - 3 sets - 10 reps  - Supine Active Straight Leg Raise  - 1 x daily - 7 x weekly - 3 sets - 10 reps  - Seated Hamstring Stretch (AKA)  - 1 x daily - 7 x weekly - 3 sets - 10 reps  Cont self adductor groin knee bent and straight stretching  Response to treatment: no worse    ASSESSMENT:   Pt presents with:Lt medial  knee pain, pes anserine and medial joint line tenderness, pain with O/A grind test, mild LE weakness, painful end range flexion,PF compression test +, O/A knee,regular exerciser having pain with cycle class-discussed when returning not standing or heavy tension.    WOMAC 14/96-(pain with stairs, walking, stiffness)points 14.5% indicating loss function  Co morbidities:anxiety, O/A,overweight  Pt could benefit from PT to address above impairments/increase function    Complexity of Evaluation: low  Based on the history including personal factors and/or comorbidities, examination of body systems including body structures and function, activity limitations, and/or participation restrictions, as well as clinical presentation, patient meets criteria for a low complexity evaluation.     PLAN:  OP PT PLAN:  Treatment/Interventions: Education/Instruction , Gait training , Manual Therapy  , Neuromuscular re-education , Self care/home management , Therapeutic activities , and Therapeutic exercise    PT Plan: Skilled PT   PT Frequency: 2 times per week   Duration:12 sessions  Insurance: ANTH SZQ - NO AUTH / $40 COPAY / $3250 OOP not met / 60V pt/ot/st comb - 0 used / AVAILITY [id number] / ds 3/17/25.    Visits:  Combined Inst/Prof, In/Out network, PT/OT/ST //  Visits Approved: 60  Certification Period Start Date:   Certification Period End Date:   Rehab Potential: Good  Plan of Care Agreement: Patient         Goals:   STG  I/Compliant with HEP   reduction in pain currently 1/10  LTG  WOMAC improve 10 points currently 14/96 14.5%  Pt will increase LE strength to a 4+-5/5 to allow pt to perform stairs step over step without pain   Pt will report being able to walk community distances without pain  Pt goal resume her activities at the gym without pain cardio wt class

## 2025-03-25 ENCOUNTER — APPOINTMENT (OUTPATIENT)
Dept: PHYSICAL THERAPY | Facility: CLINIC | Age: 60
End: 2025-03-25
Payer: COMMERCIAL

## 2025-06-16 ENCOUNTER — APPOINTMENT (OUTPATIENT)
Dept: ORTHOPEDIC SURGERY | Facility: CLINIC | Age: 60
End: 2025-06-16
Payer: COMMERCIAL

## 2025-06-17 ENCOUNTER — OFFICE VISIT (OUTPATIENT)
Dept: ORTHOPEDIC SURGERY | Facility: HOSPITAL | Age: 60
End: 2025-06-17
Payer: COMMERCIAL

## 2025-06-17 ENCOUNTER — HOSPITAL ENCOUNTER (OUTPATIENT)
Dept: RADIOLOGY | Facility: EXTERNAL LOCATION | Age: 60
Discharge: HOME | End: 2025-06-17

## 2025-06-17 DIAGNOSIS — M17.10 ARTHRITIS OF KNEE: ICD-10-CM

## 2025-06-17 DIAGNOSIS — M70.52 PES ANSERINUS BURSITIS OF LEFT KNEE: ICD-10-CM

## 2025-06-17 DIAGNOSIS — M17.12 ARTHRITIS OF LEFT KNEE: Primary | ICD-10-CM

## 2025-06-17 PROCEDURE — 99203 OFFICE O/P NEW LOW 30 MIN: CPT | Mod: 25 | Performed by: STUDENT IN AN ORGANIZED HEALTH CARE EDUCATION/TRAINING PROGRAM

## 2025-06-17 PROCEDURE — 99214 OFFICE O/P EST MOD 30 MIN: CPT | Performed by: STUDENT IN AN ORGANIZED HEALTH CARE EDUCATION/TRAINING PROGRAM

## 2025-06-17 PROCEDURE — 20611 DRAIN/INJ JOINT/BURSA W/US: CPT | Mod: LT | Performed by: STUDENT IN AN ORGANIZED HEALTH CARE EDUCATION/TRAINING PROGRAM

## 2025-06-17 PROCEDURE — 2500000004 HC RX 250 GENERAL PHARMACY W/ HCPCS (ALT 636 FOR OP/ED): Mod: JZ | Performed by: STUDENT IN AN ORGANIZED HEALTH CARE EDUCATION/TRAINING PROGRAM

## 2025-06-17 RX ADMIN — METHYLPREDNISOLONE ACETATE 80 MG: 40 INJECTION, SUSPENSION INTRA-ARTICULAR; INTRALESIONAL; INTRAMUSCULAR; INTRASYNOVIAL; SOFT TISSUE at 10:28

## 2025-06-17 RX ADMIN — LIDOCAINE HYDROCHLORIDE 2 ML: 10 INJECTION, SOLUTION INFILTRATION; PERINEURAL at 10:28

## 2025-06-17 RX ADMIN — ROPIVACAINE HYDROCHLORIDE 2 ML: 5 INJECTION EPIDURAL; INFILTRATION; PERINEURAL at 10:28

## 2025-06-17 ASSESSMENT — PAIN - FUNCTIONAL ASSESSMENT: PAIN_FUNCTIONAL_ASSESSMENT: 0-10

## 2025-06-17 ASSESSMENT — PAIN SCALES - GENERAL: PAINLEVEL_OUTOF10: 7

## 2025-06-24 ENCOUNTER — APPOINTMENT (OUTPATIENT)
Dept: ORTHOPEDIC SURGERY | Facility: HOSPITAL | Age: 60
End: 2025-06-24
Payer: COMMERCIAL

## 2025-06-28 DIAGNOSIS — E03.9 HYPOTHYROIDISM, UNSPECIFIED TYPE: ICD-10-CM

## 2025-06-30 RX ORDER — LEVOTHYROXINE SODIUM 50 UG/1
TABLET ORAL
Qty: 75 TABLET | Refills: 3 | Status: SHIPPED | OUTPATIENT
Start: 2025-06-30

## 2025-07-01 RX ORDER — LIDOCAINE HYDROCHLORIDE 10 MG/ML
2 INJECTION, SOLUTION INFILTRATION; PERINEURAL
Status: COMPLETED | OUTPATIENT
Start: 2025-06-17 | End: 2025-06-17

## 2025-07-01 RX ORDER — METHYLPREDNISOLONE ACETATE 40 MG/ML
80 INJECTION, SUSPENSION INTRA-ARTICULAR; INTRALESIONAL; INTRAMUSCULAR; SOFT TISSUE
Status: COMPLETED | OUTPATIENT
Start: 2025-06-17 | End: 2025-06-17

## 2025-07-01 RX ORDER — ROPIVACAINE HYDROCHLORIDE 5 MG/ML
2 INJECTION, SOLUTION EPIDURAL; INFILTRATION; PERINEURAL
Status: COMPLETED | OUTPATIENT
Start: 2025-06-17 | End: 2025-06-17

## 2025-07-01 NOTE — PROGRESS NOTES
Sports Medicine Office Note    Today's Date:  06/17/2025     HPI: Reymundo Sweet is a 59 y.o. female with PMHx of arthritis (s/p BL thumb injections 2/10/25), ankle/foot arthritis who presents today for follow-up of chronic left knee pain.      3/17/2025: She states her pain started roughly 2 weeks ago after a cycling class, but denies any specific injury/trauma, stating her pain started that evening into the next day.  She has not been to a cycling class in quite some time but is overrall active. She did feel like the instructor was particularly aggressive in class that day. She describes her pain as medial and anterior, initially worse with cycling and leg extension.  She denies any mechanical locking/catching.  Denies any known swelling or any redness, warmth, bruising, numbness, tingling, weakness.  States pain occasionally radiates proximally over medial thigh.  She can flex and extend without issue but pain is worse with ER. She has been doing ice, heat, ibuprofen with minimal relief. She did initially have a limp with her walk and still feels like she has a small limp. Pain was initially a 6/10, she does occasionally get a burning sensation in the area and will sometimes flare up to a 7/10 but states she has had some improvement of her pain since initial onset.  She did try a compression sleeve without relief.  Denies any previous known injury or surgeries to her left knee.    6/17/2025: She presents today for follow-up of chronic left knee pain s/p left knee joint cortisone injection performed on 3/17/2025.  States she got significant relief following this injection with return of pain over the last couple of weeks.  Denies interval injury/trauma or any new redness, warmth, bruising, radiation of pain, numbness, tingling, or weakness.  States pain is roughly 7/10 at its worst and primarily over medial aspect of the left knee.  She has been following with physical therapy and following HEP.  Has been riding  stationary bike regularly, but has become more limited with this exercise due to pain.  She is interested in repeating cortisone injection today if appropriate.     She has no other complaints.     Physical Examination:      The LEFT knee is without obvious signs of acute bony deformity, erythema, ecchymosis.  There is trace to grade 1 joint effusion.  The patella is without tenderness. Apprehension is negative with medial and lateral glide. Patella crepitus is positive. Patella grind is positive. The medial joint line is tender and without bony crepitus or step-off. The lateral joint line is nontender and without bony crepitus or step-off. Flexion & extension are full and symmetrical. Varus & valgus stress test at 0° and 30° of flexion elicited mild medial joint line laxity with minimal associated pain that improved with return to neutral position. Lachman's and posterior drawer are negative.  Positive Yanria's.  There is mild tenderness along pes anserine tendons.  Minimal pain elicited with resisted knee extension/abduction.  The opposite knee is nontender and stable. Gait is minimally painful, slightly antalgic, and tandem.      Imaging:  Radiographs of the left knee obtained 3/17/2025 were reviewed and revealed mild to moderate DJD, primarily in medial and patellofemoral compartments with associated spurring and chondral loose body formation, otherwise no evidence of acute osseous abnormalities.     The studies were reviewed by me personally in the office today.    Problem List Items Addressed This Visit    None  Visit Diagnoses         Codes      Arthritis of left knee    -  Primary M17.12    Relevant Orders    Point of Care Ultrasound (Completed)      Pes anserinus bursitis of left knee     M70.52          Procedure Note:  After consent was obtained, the left knee was prepped in a sterile fashion. Ultrasound guidance was used to help insure proper needle placement into the  joint , decrease patient  discomfort, and decrease collateral damage. The joint was visualized and Depo-Medrol 80 mg with lidocaine 1% 2 mL & ropivacaine 0.5% 2 mL were injected without any complications. Ultrasound images were saved on an internal file for later reference. The patient tolerated the procedure well and the area was cleaned and bandaged.  Patient ID: Reymundo Sweet is a 59 y.o. female.    L Inj/Asp: L knee on 6/17/2025 10:28 AM  Indications: pain  Details: 22 G needle, ultrasound-guided superolateral approach  Medications: 2 mL lidocaine 10 mg/mL (1 %); 80 mg methylPREDNISolone acetate 40 mg/mL; 2 mL ropivacaine 5 mg/mL (0.5 %)  Outcome: tolerated well, no immediate complications  Procedure, treatment alternatives, risks and benefits explained, specific risks discussed. Consent was given by the patient. Immediately prior to procedure a time out was called to verify the correct patient, procedure, equipment, support staff and site/side marked as required. Patient was prepped and draped in the usual sterile fashion.         Assessment and Plan:    We reviewed the exam and imaging findings and discussed the conservative and surgical treatment options. We agreed to a repeat hopefully therapeutic cortisone injection into the left knee joint.   She tolerated this well. Activity modifications were reviewed.  She will keep any scheduled follow-up with physical therapy and discussed the importance of regular home exercises.  Recommended prescription doses of Tylenol, Voltaren gel, and encouraged use of ice or heat as needed for acute flares of pain.  Discussed with patient that she may benefit from medial  knee brace, however deferred at this time.  Plan for follow-up in 3 months for re-evaluation, otherwise may follow-up sooner if any new concerns arise.  Discussed this plan with the patient who is understanding and agreeable.    **This note was dictated using Dragon speech recognition software and was not corrected for  spelling or grammatical errors**.    Singh Renee,   Primary Care Sports Medicine  [facility] [place]

## 2025-07-13 NOTE — PROGRESS NOTES
[facility]  Hand and Upper Extremity Service  Follow up visit         Follow up for: Bilateral thumbs     Interval History: She returns for her bilateral thumbs. She received bilateral thumb CMC injections at last visit. In the interval period of time she developed a left knee issue that ultimately responded very nicely to an injection in June by Dr. Renee.               Past medical history, medications, allergies, surgical history and review of systems are reviewed and otherwise unchanged when compared to last visit on 2/10/25         Examination:  Constitutional: Oriented to person, place, and time.  Appears well-developed and well-nourished.  Head: Normocephalic and atraumatic.  Eyes: Pupils are equal, round, and reactive to light.  Cardiovascular: Intact distal pulses.  Pulmonary/Chest/Breast: Effort normal. No respiratory distress.  Neurological: Alert and oriented to person, place, and time.  Skin: Skin is warm and dry.  Psychiatric: normal mood and affect.  Behavior is normal.  Musculoskeletal: Bilateral hands reveal bony prominence and soft tissue swelling bilateral thumb CMC joints. Positive thumb CMC grind test. No significant MP joint hyperextension instability.       Impression: Bilateral thumb CMC arthritis       Plan: We gave her bilateral thumb CMC joint injections. She will follow up with me as needed.       In Office Procedures Performed: Bilateral thumb CMC injections  S Inj/Asp: bilateral thumb CMC on 7/14/2025 9:16 AM  Indications: pain  Details: 25 G needle, dorsal approach  Medications (Right): 20 mg triamcinolone acetonide 40 mg/mL; 0.5 mL lidocaine 10 mg/mL (1 %)  Medications (Left): 20 mg triamcinolone acetonide 40 mg/mL; 0.5 mL lidocaine 10 mg/mL (1 %)  Outcome: tolerated well, no immediate complications  Procedure, treatment alternatives, risks and benefits explained, specific risks discussed. Consent was given by the patient. Immediately prior to  procedure a time out was called to verify the correct patient, procedure, equipment, support staff and site/side marked as required. Patient was prepped and draped in the usual sterile fashion.       Follow up: As needed             Alvaro Junior MD  [facility]  Department of Orthopaedic Surgery  Hand and Upper Extremity Reconstruction    Scribe Attestation  By signing my name below, I, Susu Braulio , Scribe   attest that this documentation has been prepared under the direction and in the presence of Dr. Alvaro Junior.    Dictation performed with the use of voice recognition software.  Syntax and grammatical errors may exist.

## 2025-07-14 ENCOUNTER — APPOINTMENT (OUTPATIENT)
Dept: ORTHOPEDIC SURGERY | Facility: CLINIC | Age: 60
End: 2025-07-14
Payer: COMMERCIAL

## 2025-07-14 VITALS — WEIGHT: 138 LBS | BODY MASS INDEX: 23.56 KG/M2 | HEIGHT: 64 IN

## 2025-07-14 DIAGNOSIS — M18.0 ARTHRITIS OF CARPOMETACARPAL (CMC) JOINT OF BOTH THUMBS: Primary | ICD-10-CM

## 2025-07-14 PROCEDURE — 99213 OFFICE O/P EST LOW 20 MIN: CPT | Performed by: ORTHOPAEDIC SURGERY

## 2025-07-14 PROCEDURE — 20600 DRAIN/INJ JOINT/BURSA W/O US: CPT | Performed by: ORTHOPAEDIC SURGERY

## 2025-07-14 PROCEDURE — 1036F TOBACCO NON-USER: CPT | Performed by: ORTHOPAEDIC SURGERY

## 2025-07-14 PROCEDURE — 3008F BODY MASS INDEX DOCD: CPT | Performed by: ORTHOPAEDIC SURGERY

## 2025-07-14 RX ORDER — LIDOCAINE HYDROCHLORIDE 10 MG/ML
0.5 INJECTION, SOLUTION INFILTRATION; PERINEURAL
Status: COMPLETED | OUTPATIENT
Start: 2025-07-14 | End: 2025-07-14

## 2025-07-14 RX ORDER — TRIAMCINOLONE ACETONIDE 40 MG/ML
20 INJECTION, SUSPENSION INTRA-ARTICULAR; INTRAMUSCULAR
Status: COMPLETED | OUTPATIENT
Start: 2025-07-14 | End: 2025-07-14

## 2025-07-14 RX ADMIN — LIDOCAINE HYDROCHLORIDE 0.5 ML: 10 INJECTION, SOLUTION INFILTRATION; PERINEURAL at 09:16

## 2025-07-14 RX ADMIN — TRIAMCINOLONE ACETONIDE 20 MG: 40 INJECTION, SUSPENSION INTRA-ARTICULAR; INTRAMUSCULAR at 09:16

## 2025-08-06 DIAGNOSIS — L71.9 ROSACEA: ICD-10-CM

## 2025-08-06 RX ORDER — IVERMECTIN 10 MG/G
1 CREAM TOPICAL DAILY
Qty: 45 G | Refills: 11 | Status: SHIPPED | OUTPATIENT
Start: 2025-08-06

## 2025-09-02 ASSESSMENT — DERMATOLOGY QUALITY OF LIFE (QOL) ASSESSMENT
RATE HOW EMOTIONALLY BOTHERED YOU ARE BY YOUR SKIN PROBLEM (FOR EXAMPLE, WORRY, EMBARRASSMENT, FRUSTRATION): 1
WHAT SINGLE SKIN CONDITION LISTED BELOW IS THE PATIENT ANSWERING THE QUALITY-OF-LIFE ASSESSMENT QUESTIONS ABOUT: ROSACEA
ARE THERE EXCLUSIONS OR EXCEPTIONS FOR THE QUALITY OF LIFE ASSESSMENT: NO
RATE HOW BOTHERED YOU ARE BY SYMPTOMS OF YOUR SKIN PROBLEM (EG, ITCHING, STINGING BURNING, HURTING OR SKIN IRRITATION): 1
WHAT SINGLE SKIN CONDITION LISTED BELOW IS THE PATIENT ANSWERING THE QUALITY-OF-LIFE ASSESSMENT QUESTIONS ABOUT: ROSACEA
RATE HOW EMOTIONALLY BOTHERED YOU ARE BY YOUR SKIN PROBLEM (FOR EXAMPLE, WORRY, EMBARRASSMENT, FRUSTRATION): 1
DATE THE QUALITY-OF-LIFE ASSESSMENT WAS COMPLETED: 67451
RATE HOW BOTHERED YOU ARE BY EFFECTS OF YOUR SKIN PROBLEMS ON YOUR ACTIVITIES (EG, GOING OUT, ACCOMPLISHING WHAT YOU WANT, WORK ACTIVITIES OR YOUR RELATIONSHIPS WITH OTHERS): 0 - NEVER BOTHERED
RATE HOW BOTHERED YOU ARE BY SYMPTOMS OF YOUR SKIN PROBLEM (EG, ITCHING, STINGING BURNING, HURTING OR SKIN IRRITATION): 1
RATE HOW BOTHERED YOU ARE BY EFFECTS OF YOUR SKIN PROBLEMS ON YOUR ACTIVITIES (EG, GOING OUT, ACCOMPLISHING WHAT YOU WANT, WORK ACTIVITIES OR YOUR RELATIONSHIPS WITH OTHERS): 0 - NEVER BOTHERED

## 2025-09-02 ASSESSMENT — PATIENT GLOBAL ASSESSMENT (PGA): WHAT IS THE PGA: PATIENT GLOBAL ASSESSMENT:  2 - MILD

## 2025-09-03 ENCOUNTER — APPOINTMENT (OUTPATIENT)
Dept: DERMATOLOGY | Facility: CLINIC | Age: 60
End: 2025-09-03
Payer: COMMERCIAL

## 2025-11-04 ENCOUNTER — APPOINTMENT (OUTPATIENT)
Dept: DERMATOLOGY | Facility: CLINIC | Age: 60
End: 2025-11-04
Payer: COMMERCIAL

## 2025-12-18 ENCOUNTER — APPOINTMENT (OUTPATIENT)
Dept: PRIMARY CARE | Facility: CLINIC | Age: 60
End: 2025-12-18
Payer: COMMERCIAL

## 2026-05-05 ENCOUNTER — APPOINTMENT (OUTPATIENT)
Dept: DERMATOLOGY | Facility: CLINIC | Age: 61
End: 2026-05-05
Payer: COMMERCIAL